# Patient Record
Sex: FEMALE | HISPANIC OR LATINO | Employment: FULL TIME | URBAN - METROPOLITAN AREA
[De-identification: names, ages, dates, MRNs, and addresses within clinical notes are randomized per-mention and may not be internally consistent; named-entity substitution may affect disease eponyms.]

---

## 2017-05-19 ENCOUNTER — HOSPITAL ENCOUNTER (OUTPATIENT)
Facility: MEDICAL CENTER | Age: 42
End: 2017-05-19
Attending: INTERNAL MEDICINE
Payer: MEDICAID

## 2017-05-19 LAB
CREAT UR-MCNC: 126.3 MG/DL
CREAT UR-MCNC: 25.4 MG/DL
POTASSIUM UR-SCNC: 17.7 MMOL/L
POTASSIUM UR-SCNC: 48.1 MMOL/L
SODIUM UR-SCNC: 111 MMOL/L
SODIUM UR-SCNC: 165 MMOL/L

## 2017-05-19 PROCEDURE — 82570 ASSAY OF URINE CREATININE: CPT

## 2017-05-19 PROCEDURE — 84300 ASSAY OF URINE SODIUM: CPT

## 2017-05-19 PROCEDURE — 82340 ASSAY OF CALCIUM IN URINE: CPT

## 2017-05-19 PROCEDURE — 84133 ASSAY OF URINE POTASSIUM: CPT | Mod: 91

## 2017-05-21 LAB
CALCIUM 24H UR-MCNC: 14.7 MG/DL
CALCIUM 24H UR-MCNC: 4.5 MG/DL
CALCIUM 24H UR-MRATE: NORMAL MG/D
CALCIUM 24H UR-MRATE: NORMAL MG/D
CALCIUM/CREAT 24H UR: 140 MG/G (ref 20–300)
CALCIUM/CREAT 24H UR: 225 MG/G (ref 20–300)
CREAT 24H UR-MCNC: 105 MG/DL
CREAT 24H UR-MCNC: 20 MG/DL
CREAT 24H UR-MRATE: NORMAL MG/D (ref 700–1600)
CREAT 24H UR-MRATE: NORMAL MG/D (ref 700–1600)
SPECIMEN VOL ?TM UR: NORMAL ML
SPECIMEN VOL ?TM UR: NORMAL ML

## 2020-03-13 ENCOUNTER — HOSPITAL ENCOUNTER (OUTPATIENT)
Dept: RADIOLOGY | Facility: MEDICAL CENTER | Age: 45
End: 2020-03-13
Attending: FAMILY MEDICINE
Payer: MEDICAID

## 2020-03-13 DIAGNOSIS — R10.2 PELVIC PAIN: ICD-10-CM

## 2020-08-17 ENCOUNTER — APPOINTMENT (OUTPATIENT)
Dept: ADMISSIONS | Facility: MEDICAL CENTER | Age: 45
DRG: 743 | End: 2020-08-17
Attending: OBSTETRICS & GYNECOLOGY
Payer: COMMERCIAL

## 2020-08-17 DIAGNOSIS — Z01.810 PRE-OPERATIVE CARDIOVASCULAR EXAMINATION: ICD-10-CM

## 2020-08-17 DIAGNOSIS — Z01.812 PRE-OPERATIVE LABORATORY EXAMINATION: ICD-10-CM

## 2020-08-17 LAB
ANION GAP SERPL CALC-SCNC: 13 MMOL/L (ref 7–16)
APPEARANCE UR: ABNORMAL
BACTERIA #/AREA URNS HPF: ABNORMAL /HPF
BASOPHILS # BLD AUTO: 0.7 % (ref 0–1.8)
BASOPHILS # BLD: 0.05 K/UL (ref 0–0.12)
BILIRUB UR QL STRIP.AUTO: NEGATIVE
BUN SERPL-MCNC: 19 MG/DL (ref 8–22)
CALCIUM SERPL-MCNC: 9.3 MG/DL (ref 8.5–10.5)
CHLORIDE SERPL-SCNC: 100 MMOL/L (ref 96–112)
CO2 SERPL-SCNC: 24 MMOL/L (ref 20–33)
COLOR UR: YELLOW
CREAT SERPL-MCNC: 0.49 MG/DL (ref 0.5–1.4)
EKG IMPRESSION: NORMAL
EOSINOPHIL # BLD AUTO: 0.14 K/UL (ref 0–0.51)
EOSINOPHIL NFR BLD: 1.9 % (ref 0–6.9)
EPI CELLS #/AREA URNS HPF: ABNORMAL /HPF
ERYTHROCYTE [DISTWIDTH] IN BLOOD BY AUTOMATED COUNT: 46 FL (ref 35.9–50)
GLUCOSE SERPL-MCNC: 101 MG/DL (ref 65–99)
GLUCOSE UR STRIP.AUTO-MCNC: NEGATIVE MG/DL
HCG SERPL QL: NEGATIVE
HCT VFR BLD AUTO: 35.6 % (ref 37–47)
HGB BLD-MCNC: 10.8 G/DL (ref 12–16)
HYALINE CASTS #/AREA URNS LPF: ABNORMAL /LPF
IMM GRANULOCYTES # BLD AUTO: 0.03 K/UL (ref 0–0.11)
IMM GRANULOCYTES NFR BLD AUTO: 0.4 % (ref 0–0.9)
KETONES UR STRIP.AUTO-MCNC: NEGATIVE MG/DL
LEUKOCYTE ESTERASE UR QL STRIP.AUTO: NEGATIVE
LYMPHOCYTES # BLD AUTO: 2.81 K/UL (ref 1–4.8)
LYMPHOCYTES NFR BLD: 37.2 % (ref 22–41)
MCH RBC QN AUTO: 23 PG (ref 27–33)
MCHC RBC AUTO-ENTMCNC: 30.3 G/DL (ref 33.6–35)
MCV RBC AUTO: 75.9 FL (ref 81.4–97.8)
MICRO URNS: ABNORMAL
MONOCYTES # BLD AUTO: 0.51 K/UL (ref 0–0.85)
MONOCYTES NFR BLD AUTO: 6.7 % (ref 0–13.4)
NEUTROPHILS # BLD AUTO: 4.02 K/UL (ref 2–7.15)
NEUTROPHILS NFR BLD: 53.1 % (ref 44–72)
NITRITE UR QL STRIP.AUTO: NEGATIVE
NRBC # BLD AUTO: 0 K/UL
NRBC BLD-RTO: 0 /100 WBC
PH UR STRIP.AUTO: 6 [PH] (ref 5–8)
PLATELET # BLD AUTO: 347 K/UL (ref 164–446)
PMV BLD AUTO: 10.5 FL (ref 9–12.9)
POTASSIUM SERPL-SCNC: 4.6 MMOL/L (ref 3.6–5.5)
PROT UR QL STRIP: NEGATIVE MG/DL
RBC # BLD AUTO: 4.69 M/UL (ref 4.2–5.4)
RBC # URNS HPF: ABNORMAL /HPF
RBC UR QL AUTO: NEGATIVE
SODIUM SERPL-SCNC: 137 MMOL/L (ref 135–145)
SP GR UR STRIP.AUTO: 1.02
UROBILINOGEN UR STRIP.AUTO-MCNC: 0.2 MG/DL
WBC # BLD AUTO: 7.6 K/UL (ref 4.8–10.8)
WBC #/AREA URNS HPF: ABNORMAL /HPF

## 2020-08-17 PROCEDURE — 36415 COLL VENOUS BLD VENIPUNCTURE: CPT

## 2020-08-17 PROCEDURE — 80048 BASIC METABOLIC PNL TOTAL CA: CPT

## 2020-08-17 PROCEDURE — 81001 URINALYSIS AUTO W/SCOPE: CPT

## 2020-08-17 PROCEDURE — 85025 COMPLETE CBC W/AUTO DIFF WBC: CPT

## 2020-08-17 PROCEDURE — 84703 CHORIONIC GONADOTROPIN ASSAY: CPT

## 2020-08-17 RX ORDER — LISINOPRIL 10 MG/1
10 TABLET ORAL DAILY
COMMUNITY
End: 2022-01-20

## 2020-08-17 NOTE — OR NURSING
Pt tested positive for Covid on 7/14/20. Test was done at the [facility] - [place], [place]. Pt denies any Covid symptoms currently and states she only had headaches. She reports her last headache was on 8/1/2020. Copy of Covid result scanned into patient's chart.

## 2020-08-20 NOTE — PROGRESS NOTES
COVID-19 Pre-surgery screening:    Do you have an undiagnosed respiratory illness or symptoms such as coughing or sneezing? No    1. Do you have an unexplained fever greater than 100.4 degrees Fahrenheit or 38 degrees Celsius?     No    2. Have you had direct exposure to a patient who tested positive for Covid-19?    No    Have you had any loss of your sense of taste or smell? Have you had N/V or sore throat?No    Patient has been informed of visitor policy and asked to wear a mask upon entering the hospital   Yes    PT Syriac SPEAKING ONLY, TRANSLATION PROVIDED BY ALTHEA MENA.

## 2020-08-21 ENCOUNTER — HOSPITAL ENCOUNTER (INPATIENT)
Facility: MEDICAL CENTER | Age: 45
LOS: 2 days | DRG: 743 | End: 2020-08-23
Attending: OBSTETRICS & GYNECOLOGY | Admitting: OBSTETRICS & GYNECOLOGY
Payer: COMMERCIAL

## 2020-08-21 ENCOUNTER — ANESTHESIA EVENT (OUTPATIENT)
Dept: SURGERY | Facility: MEDICAL CENTER | Age: 45
DRG: 743 | End: 2020-08-21
Payer: COMMERCIAL

## 2020-08-21 ENCOUNTER — ANESTHESIA (OUTPATIENT)
Dept: SURGERY | Facility: MEDICAL CENTER | Age: 45
DRG: 743 | End: 2020-08-21
Payer: COMMERCIAL

## 2020-08-21 LAB — HCG UR QL: NEGATIVE

## 2020-08-21 PROCEDURE — 770006 HCHG ROOM/CARE - MED/SURG/GYN SEMI*

## 2020-08-21 PROCEDURE — 700101 HCHG RX REV CODE 250: Performed by: ANESTHESIOLOGY

## 2020-08-21 PROCEDURE — 160036 HCHG PACU - EA ADDL 30 MINS PHASE I: Performed by: OBSTETRICS & GYNECOLOGY

## 2020-08-21 PROCEDURE — 160042 HCHG SURGERY MINUTES - EA ADDL 1 MIN LEVEL 5: Performed by: OBSTETRICS & GYNECOLOGY

## 2020-08-21 PROCEDURE — 500886 HCHG PACK, LAPAROSCOPY: Performed by: OBSTETRICS & GYNECOLOGY

## 2020-08-21 PROCEDURE — A9270 NON-COVERED ITEM OR SERVICE: HCPCS | Performed by: OBSTETRICS & GYNECOLOGY

## 2020-08-21 PROCEDURE — 501582 HCHG TROCAR, THRD BLADED: Performed by: OBSTETRICS & GYNECOLOGY

## 2020-08-21 PROCEDURE — A4338 INDWELLING CATHETER LATEX: HCPCS | Performed by: OBSTETRICS & GYNECOLOGY

## 2020-08-21 PROCEDURE — 700111 HCHG RX REV CODE 636 W/ 250 OVERRIDE (IP): Performed by: ANESTHESIOLOGY

## 2020-08-21 PROCEDURE — 700102 HCHG RX REV CODE 250 W/ 637 OVERRIDE(OP): Performed by: ANESTHESIOLOGY

## 2020-08-21 PROCEDURE — 500002 HCHG ADHESIVE, DERMABOND: Performed by: OBSTETRICS & GYNECOLOGY

## 2020-08-21 PROCEDURE — 500800 HCHG LAPAROSCOPIC J/L HOOK: Performed by: OBSTETRICS & GYNECOLOGY

## 2020-08-21 PROCEDURE — 501581 HCHG TROCAR: Performed by: OBSTETRICS & GYNECOLOGY

## 2020-08-21 PROCEDURE — 700111 HCHG RX REV CODE 636 W/ 250 OVERRIDE (IP): Performed by: OBSTETRICS & GYNECOLOGY

## 2020-08-21 PROCEDURE — 500522 HCHG ENDOSTITCH SUTURING DEVICE: Performed by: OBSTETRICS & GYNECOLOGY

## 2020-08-21 PROCEDURE — 501577 HCHG TROCAR, STEP 11MM: Performed by: OBSTETRICS & GYNECOLOGY

## 2020-08-21 PROCEDURE — 700101 HCHG RX REV CODE 250: Performed by: OBSTETRICS & GYNECOLOGY

## 2020-08-21 PROCEDURE — 501330 HCHG SET, CYSTO IRRIG TUBING: Performed by: OBSTETRICS & GYNECOLOGY

## 2020-08-21 PROCEDURE — 0UT70ZZ RESECTION OF BILATERAL FALLOPIAN TUBES, OPEN APPROACH: ICD-10-PCS | Performed by: OBSTETRICS & GYNECOLOGY

## 2020-08-21 PROCEDURE — 700102 HCHG RX REV CODE 250 W/ 637 OVERRIDE(OP): Performed by: OBSTETRICS & GYNECOLOGY

## 2020-08-21 PROCEDURE — 0DNU0ZZ RELEASE OMENTUM, OPEN APPROACH: ICD-10-PCS | Performed by: OBSTETRICS & GYNECOLOGY

## 2020-08-21 PROCEDURE — 160002 HCHG RECOVERY MINUTES (STAT): Performed by: OBSTETRICS & GYNECOLOGY

## 2020-08-21 PROCEDURE — 81025 URINE PREGNANCY TEST: CPT

## 2020-08-21 PROCEDURE — 0UJD4ZZ INSPECTION OF UTERUS AND CERVIX, PERCUTANEOUS ENDOSCOPIC APPROACH: ICD-10-PCS | Performed by: OBSTETRICS & GYNECOLOGY

## 2020-08-21 PROCEDURE — 160048 HCHG OR STATISTICAL LEVEL 1-5: Performed by: OBSTETRICS & GYNECOLOGY

## 2020-08-21 PROCEDURE — 700105 HCHG RX REV CODE 258: Performed by: OBSTETRICS & GYNECOLOGY

## 2020-08-21 PROCEDURE — 502703 HCHG DEVICE, LIGASURE V SEALER: Performed by: OBSTETRICS & GYNECOLOGY

## 2020-08-21 PROCEDURE — 0TNB0ZZ RELEASE BLADDER, OPEN APPROACH: ICD-10-PCS | Performed by: OBSTETRICS & GYNECOLOGY

## 2020-08-21 PROCEDURE — 160009 HCHG ANES TIME/MIN: Performed by: OBSTETRICS & GYNECOLOGY

## 2020-08-21 PROCEDURE — 501838 HCHG SUTURE GENERAL: Performed by: OBSTETRICS & GYNECOLOGY

## 2020-08-21 PROCEDURE — 160035 HCHG PACU - 1ST 60 MINS PHASE I: Performed by: OBSTETRICS & GYNECOLOGY

## 2020-08-21 PROCEDURE — 0UT90ZL RESECTION OF UTERUS, SUPRACERVICAL, OPEN APPROACH: ICD-10-PCS | Performed by: OBSTETRICS & GYNECOLOGY

## 2020-08-21 PROCEDURE — A9270 NON-COVERED ITEM OR SERVICE: HCPCS | Performed by: ANESTHESIOLOGY

## 2020-08-21 PROCEDURE — 503051 HCHG CLOSURE PRINEO SKIN: Performed by: OBSTETRICS & GYNECOLOGY

## 2020-08-21 PROCEDURE — 160031 HCHG SURGERY MINUTES - 1ST 30 MINS LEVEL 5: Performed by: OBSTETRICS & GYNECOLOGY

## 2020-08-21 PROCEDURE — 501411 HCHG SPONGE, BABY LAP W/O RINGS: Performed by: OBSTETRICS & GYNECOLOGY

## 2020-08-21 PROCEDURE — 88307 TISSUE EXAM BY PATHOLOGIST: CPT

## 2020-08-21 RX ORDER — DEXAMETHASONE SODIUM PHOSPHATE 4 MG/ML
4 INJECTION, SOLUTION INTRA-ARTICULAR; INTRALESIONAL; INTRAMUSCULAR; INTRAVENOUS; SOFT TISSUE
Status: DISCONTINUED | OUTPATIENT
Start: 2020-08-21 | End: 2020-08-23 | Stop reason: HOSPADM

## 2020-08-21 RX ORDER — OXYCODONE HYDROCHLORIDE AND ACETAMINOPHEN 5; 325 MG/1; MG/1
1-2 TABLET ORAL EVERY 4 HOURS PRN
Status: DISCONTINUED | OUTPATIENT
Start: 2020-08-21 | End: 2020-08-23 | Stop reason: HOSPADM

## 2020-08-21 RX ORDER — BUPIVACAINE HYDROCHLORIDE AND EPINEPHRINE 2.5; 5 MG/ML; UG/ML
INJECTION, SOLUTION EPIDURAL; INFILTRATION; INTRACAUDAL; PERINEURAL
Status: DISCONTINUED | OUTPATIENT
Start: 2020-08-21 | End: 2020-08-21 | Stop reason: HOSPADM

## 2020-08-21 RX ORDER — ONDANSETRON 2 MG/ML
4 INJECTION INTRAMUSCULAR; INTRAVENOUS
Status: DISCONTINUED | OUTPATIENT
Start: 2020-08-21 | End: 2020-08-21 | Stop reason: HOSPADM

## 2020-08-21 RX ORDER — AMOXICILLIN 250 MG
1 CAPSULE ORAL NIGHTLY
Status: DISCONTINUED | OUTPATIENT
Start: 2020-08-21 | End: 2020-08-23 | Stop reason: HOSPADM

## 2020-08-21 RX ORDER — HYDROMORPHONE HYDROCHLORIDE 1 MG/ML
0.1 INJECTION, SOLUTION INTRAMUSCULAR; INTRAVENOUS; SUBCUTANEOUS
Status: DISCONTINUED | OUTPATIENT
Start: 2020-08-21 | End: 2020-08-21

## 2020-08-21 RX ORDER — HYDROMORPHONE HYDROCHLORIDE 2 MG/ML
INJECTION, SOLUTION INTRAMUSCULAR; INTRAVENOUS; SUBCUTANEOUS PRN
Status: DISCONTINUED | OUTPATIENT
Start: 2020-08-21 | End: 2020-08-21 | Stop reason: SURG

## 2020-08-21 RX ORDER — MEPERIDINE HYDROCHLORIDE 25 MG/ML
6.25 INJECTION INTRAMUSCULAR; INTRAVENOUS; SUBCUTANEOUS
Status: DISCONTINUED | OUTPATIENT
Start: 2020-08-21 | End: 2020-08-21 | Stop reason: HOSPADM

## 2020-08-21 RX ORDER — HALOPERIDOL 5 MG/ML
1 INJECTION INTRAMUSCULAR EVERY 6 HOURS PRN
Status: DISCONTINUED | OUTPATIENT
Start: 2020-08-21 | End: 2020-08-23 | Stop reason: HOSPADM

## 2020-08-21 RX ORDER — ACETAMINOPHEN 500 MG
1000 TABLET ORAL ONCE
Status: COMPLETED | OUTPATIENT
Start: 2020-08-21 | End: 2020-08-21

## 2020-08-21 RX ORDER — CELECOXIB 200 MG/1
200 CAPSULE ORAL ONCE
Status: COMPLETED | OUTPATIENT
Start: 2020-08-21 | End: 2020-08-21

## 2020-08-21 RX ORDER — SODIUM CHLORIDE, SODIUM LACTATE, POTASSIUM CHLORIDE, CALCIUM CHLORIDE 600; 310; 30; 20 MG/100ML; MG/100ML; MG/100ML; MG/100ML
INJECTION, SOLUTION INTRAVENOUS EVERY 6 HOURS
Status: COMPLETED | OUTPATIENT
Start: 2020-08-21 | End: 2020-08-22

## 2020-08-21 RX ORDER — DIPHENHYDRAMINE HYDROCHLORIDE 50 MG/ML
12.5 INJECTION INTRAMUSCULAR; INTRAVENOUS
Status: DISCONTINUED | OUTPATIENT
Start: 2020-08-21 | End: 2020-08-21 | Stop reason: HOSPADM

## 2020-08-21 RX ORDER — EPINEPHRINE 1 MG/ML(1)
AMPUL (ML) INJECTION
Status: COMPLETED
Start: 2020-08-21 | End: 2020-08-21

## 2020-08-21 RX ORDER — LIDOCAINE HYDROCHLORIDE 20 MG/ML
JELLY TOPICAL PRN
Status: DISCONTINUED | OUTPATIENT
Start: 2020-08-21 | End: 2020-08-21 | Stop reason: SURG

## 2020-08-21 RX ORDER — OXYCODONE HCL 5 MG/5 ML
5 SOLUTION, ORAL ORAL
Status: COMPLETED | OUTPATIENT
Start: 2020-08-21 | End: 2020-08-21

## 2020-08-21 RX ORDER — HALOPERIDOL 5 MG/ML
1 INJECTION INTRAMUSCULAR
Status: DISCONTINUED | OUTPATIENT
Start: 2020-08-21 | End: 2020-08-21 | Stop reason: HOSPADM

## 2020-08-21 RX ORDER — SODIUM CHLORIDE, SODIUM LACTATE, POTASSIUM CHLORIDE, CALCIUM CHLORIDE 600; 310; 30; 20 MG/100ML; MG/100ML; MG/100ML; MG/100ML
INJECTION, SOLUTION INTRAVENOUS CONTINUOUS
Status: ACTIVE | OUTPATIENT
Start: 2020-08-21 | End: 2020-08-22

## 2020-08-21 RX ORDER — CEFAZOLIN SODIUM 1 G/3ML
INJECTION, POWDER, FOR SOLUTION INTRAMUSCULAR; INTRAVENOUS PRN
Status: DISCONTINUED | OUTPATIENT
Start: 2020-08-21 | End: 2020-08-21 | Stop reason: SURG

## 2020-08-21 RX ORDER — DOCUSATE SODIUM 100 MG/1
100 CAPSULE, LIQUID FILLED ORAL 2 TIMES DAILY
Status: DISCONTINUED | OUTPATIENT
Start: 2020-08-21 | End: 2020-08-23 | Stop reason: HOSPADM

## 2020-08-21 RX ORDER — OXYCODONE HCL 5 MG/5 ML
10 SOLUTION, ORAL ORAL
Status: COMPLETED | OUTPATIENT
Start: 2020-08-21 | End: 2020-08-21

## 2020-08-21 RX ORDER — SCOLOPAMINE TRANSDERMAL SYSTEM 1 MG/1
1 PATCH, EXTENDED RELEASE TRANSDERMAL
Status: DISCONTINUED | OUTPATIENT
Start: 2020-08-21 | End: 2020-08-23 | Stop reason: HOSPADM

## 2020-08-21 RX ORDER — LIDOCAINE HYDROCHLORIDE 20 MG/ML
INJECTION, SOLUTION EPIDURAL; INFILTRATION; INTRACAUDAL; PERINEURAL PRN
Status: DISCONTINUED | OUTPATIENT
Start: 2020-08-21 | End: 2020-08-21 | Stop reason: SURG

## 2020-08-21 RX ORDER — ROCURONIUM BROMIDE 10 MG/ML
INJECTION, SOLUTION INTRAVENOUS PRN
Status: DISCONTINUED | OUTPATIENT
Start: 2020-08-21 | End: 2020-08-21 | Stop reason: SURG

## 2020-08-21 RX ORDER — SIMETHICONE 80 MG
80 TABLET,CHEWABLE ORAL EVERY 8 HOURS PRN
Status: DISCONTINUED | OUTPATIENT
Start: 2020-08-21 | End: 2020-08-23 | Stop reason: HOSPADM

## 2020-08-21 RX ORDER — ONDANSETRON 2 MG/ML
INJECTION INTRAMUSCULAR; INTRAVENOUS PRN
Status: DISCONTINUED | OUTPATIENT
Start: 2020-08-21 | End: 2020-08-21 | Stop reason: SURG

## 2020-08-21 RX ORDER — KETOROLAC TROMETHAMINE 30 MG/ML
30 INJECTION, SOLUTION INTRAMUSCULAR; INTRAVENOUS EVERY 6 HOURS
Status: COMPLETED | OUTPATIENT
Start: 2020-08-21 | End: 2020-08-22

## 2020-08-21 RX ORDER — BISACODYL 10 MG
10 SUPPOSITORY, RECTAL RECTAL
Status: DISCONTINUED | OUTPATIENT
Start: 2020-08-21 | End: 2020-08-23 | Stop reason: HOSPADM

## 2020-08-21 RX ORDER — DIPHENHYDRAMINE HYDROCHLORIDE 50 MG/ML
25 INJECTION INTRAMUSCULAR; INTRAVENOUS EVERY 6 HOURS PRN
Status: DISCONTINUED | OUTPATIENT
Start: 2020-08-21 | End: 2020-08-23 | Stop reason: HOSPADM

## 2020-08-21 RX ORDER — MIDAZOLAM HYDROCHLORIDE 1 MG/ML
INJECTION INTRAMUSCULAR; INTRAVENOUS PRN
Status: DISCONTINUED | OUTPATIENT
Start: 2020-08-21 | End: 2020-08-21 | Stop reason: SURG

## 2020-08-21 RX ORDER — LABETALOL HYDROCHLORIDE 5 MG/ML
5 INJECTION, SOLUTION INTRAVENOUS
Status: DISCONTINUED | OUTPATIENT
Start: 2020-08-21 | End: 2020-08-21 | Stop reason: HOSPADM

## 2020-08-21 RX ORDER — ONDANSETRON 2 MG/ML
4 INJECTION INTRAMUSCULAR; INTRAVENOUS EVERY 4 HOURS PRN
Status: DISCONTINUED | OUTPATIENT
Start: 2020-08-21 | End: 2020-08-23 | Stop reason: HOSPADM

## 2020-08-21 RX ORDER — HYDROMORPHONE HYDROCHLORIDE 1 MG/ML
0.4 INJECTION, SOLUTION INTRAMUSCULAR; INTRAVENOUS; SUBCUTANEOUS
Status: DISCONTINUED | OUTPATIENT
Start: 2020-08-21 | End: 2020-08-21

## 2020-08-21 RX ORDER — DEXAMETHASONE SODIUM PHOSPHATE 4 MG/ML
INJECTION, SOLUTION INTRA-ARTICULAR; INTRALESIONAL; INTRAMUSCULAR; INTRAVENOUS; SOFT TISSUE PRN
Status: DISCONTINUED | OUTPATIENT
Start: 2020-08-21 | End: 2020-08-21 | Stop reason: SURG

## 2020-08-21 RX ORDER — SODIUM CHLORIDE, SODIUM LACTATE, POTASSIUM CHLORIDE, CALCIUM CHLORIDE 600; 310; 30; 20 MG/100ML; MG/100ML; MG/100ML; MG/100ML
INJECTION, SOLUTION INTRAVENOUS CONTINUOUS
Status: DISCONTINUED | OUTPATIENT
Start: 2020-08-21 | End: 2020-08-21 | Stop reason: HOSPADM

## 2020-08-21 RX ORDER — BUPIVACAINE HYDROCHLORIDE 2.5 MG/ML
INJECTION, SOLUTION EPIDURAL; INFILTRATION; INTRACAUDAL
Status: COMPLETED
Start: 2020-08-21 | End: 2020-08-21

## 2020-08-21 RX ORDER — HYDROMORPHONE HYDROCHLORIDE 1 MG/ML
0.2 INJECTION, SOLUTION INTRAMUSCULAR; INTRAVENOUS; SUBCUTANEOUS
Status: DISCONTINUED | OUTPATIENT
Start: 2020-08-21 | End: 2020-08-21

## 2020-08-21 RX ORDER — CELECOXIB 200 MG/1
200 CAPSULE ORAL 2 TIMES DAILY
Status: DISCONTINUED | OUTPATIENT
Start: 2020-08-22 | End: 2020-08-23 | Stop reason: HOSPADM

## 2020-08-21 RX ADMIN — POVIDONE-IODINE 15 ML: 10 SOLUTION TOPICAL at 13:01

## 2020-08-21 RX ADMIN — DEXAMETHASONE SODIUM PHOSPHATE 8 MG: 4 INJECTION, SOLUTION INTRA-ARTICULAR; INTRALESIONAL; INTRAMUSCULAR; INTRAVENOUS; SOFT TISSUE at 15:20

## 2020-08-21 RX ADMIN — FENTANYL CITRATE 50 MCG: 50 INJECTION INTRAMUSCULAR; INTRAVENOUS at 19:27

## 2020-08-21 RX ADMIN — CELECOXIB 200 MG: 200 CAPSULE ORAL at 13:01

## 2020-08-21 RX ADMIN — FENTANYL CITRATE 100 MCG: 50 INJECTION INTRAMUSCULAR; INTRAVENOUS at 16:07

## 2020-08-21 RX ADMIN — PROPOFOL 60 MG: 10 INJECTION, EMULSION INTRAVENOUS at 17:19

## 2020-08-21 RX ADMIN — LIDOCAINE HYDROCHLORIDE 5 ML: 20 INJECTION, SOLUTION EPIDURAL; INFILTRATION; INTRACAUDAL at 14:57

## 2020-08-21 RX ADMIN — PROPOFOL 200 MG: 10 INJECTION, EMULSION INTRAVENOUS at 14:57

## 2020-08-21 RX ADMIN — SODIUM CHLORIDE, POTASSIUM CHLORIDE, SODIUM LACTATE AND CALCIUM CHLORIDE: 600; 310; 30; 20 INJECTION, SOLUTION INTRAVENOUS at 15:40

## 2020-08-21 RX ADMIN — SODIUM CHLORIDE, POTASSIUM CHLORIDE, SODIUM LACTATE AND CALCIUM CHLORIDE: 600; 310; 30; 20 INJECTION, SOLUTION INTRAVENOUS at 13:02

## 2020-08-21 RX ADMIN — OXYCODONE HYDROCHLORIDE 10 MG: 5 SOLUTION ORAL at 19:24

## 2020-08-21 RX ADMIN — FENTANYL CITRATE 100 MCG: 50 INJECTION INTRAMUSCULAR; INTRAVENOUS at 14:58

## 2020-08-21 RX ADMIN — CEFAZOLIN 2 G: 330 INJECTION, POWDER, FOR SOLUTION INTRAMUSCULAR; INTRAVENOUS at 15:00

## 2020-08-21 RX ADMIN — HYDROMORPHONE HYDROCHLORIDE 0.5 MG: 2 INJECTION, SOLUTION INTRAMUSCULAR; INTRAVENOUS; SUBCUTANEOUS at 17:11

## 2020-08-21 RX ADMIN — OXYCODONE HYDROCHLORIDE AND ACETAMINOPHEN 2 TABLET: 5; 325 TABLET ORAL at 23:30

## 2020-08-21 RX ADMIN — ROCURONIUM BROMIDE 20 MG: 10 INJECTION, SOLUTION INTRAVENOUS at 16:48

## 2020-08-21 RX ADMIN — EPHEDRINE SULFATE 10 MG: 50 INJECTION, SOLUTION INTRAVENOUS at 15:27

## 2020-08-21 RX ADMIN — SODIUM CHLORIDE, POTASSIUM CHLORIDE, SODIUM LACTATE AND CALCIUM CHLORIDE: 600; 310; 30; 20 INJECTION, SOLUTION INTRAVENOUS at 20:40

## 2020-08-21 RX ADMIN — ROCURONIUM BROMIDE 20 MG: 10 INJECTION, SOLUTION INTRAVENOUS at 15:42

## 2020-08-21 RX ADMIN — MEPERIDINE HYDROCHLORIDE 6.25 MG: 25 INJECTION INTRAMUSCULAR; INTRAVENOUS; SUBCUTANEOUS at 17:55

## 2020-08-21 RX ADMIN — LIDOCAINE HYDROCHLORIDE 3 ML: 20 JELLY TOPICAL at 14:58

## 2020-08-21 RX ADMIN — ONDANSETRON 4 MG: 2 INJECTION INTRAMUSCULAR; INTRAVENOUS at 17:02

## 2020-08-21 RX ADMIN — FENTANYL CITRATE 50 MCG: 50 INJECTION INTRAMUSCULAR; INTRAVENOUS at 15:08

## 2020-08-21 RX ADMIN — MEPERIDINE HYDROCHLORIDE 6.25 MG: 25 INJECTION INTRAMUSCULAR; INTRAVENOUS; SUBCUTANEOUS at 17:50

## 2020-08-21 RX ADMIN — ROCURONIUM BROMIDE 50 MG: 10 INJECTION, SOLUTION INTRAVENOUS at 14:57

## 2020-08-21 RX ADMIN — ACETAMINOPHEN 1000 MG: 500 TABLET ORAL at 13:01

## 2020-08-21 RX ADMIN — MIDAZOLAM HYDROCHLORIDE 2 MG: 1 INJECTION, SOLUTION INTRAMUSCULAR; INTRAVENOUS at 14:53

## 2020-08-21 RX ADMIN — ROCURONIUM BROMIDE 30 MG: 10 INJECTION, SOLUTION INTRAVENOUS at 16:07

## 2020-08-21 RX ADMIN — SUGAMMADEX 200 MG: 100 INJECTION, SOLUTION INTRAVENOUS at 17:09

## 2020-08-21 RX ADMIN — HYDROMORPHONE HYDROCHLORIDE 0.5 MG: 2 INJECTION, SOLUTION INTRAMUSCULAR; INTRAVENOUS; SUBCUTANEOUS at 17:14

## 2020-08-21 RX ADMIN — KETOROLAC TROMETHAMINE 30 MG: 30 INJECTION, SOLUTION INTRAMUSCULAR at 20:37

## 2020-08-21 SDOH — ECONOMIC STABILITY: FOOD INSECURITY: WITHIN THE PAST 12 MONTHS, YOU WORRIED THAT YOUR FOOD WOULD RUN OUT BEFORE YOU GOT MONEY TO BUY MORE.: NEVER TRUE

## 2020-08-21 SDOH — ECONOMIC STABILITY: FOOD INSECURITY: WITHIN THE PAST 12 MONTHS, THE FOOD YOU BOUGHT JUST DIDN'T LAST AND YOU DIDN'T HAVE MONEY TO GET MORE.: NEVER TRUE

## 2020-08-21 SDOH — ECONOMIC STABILITY: TRANSPORTATION INSECURITY
IN THE PAST 12 MONTHS, HAS LACK OF TRANSPORTATION KEPT YOU FROM MEETINGS, WORK, OR FROM GETTING THINGS NEEDED FOR DAILY LIVING?: NO

## 2020-08-21 SDOH — ECONOMIC STABILITY: TRANSPORTATION INSECURITY
IN THE PAST 12 MONTHS, HAS THE LACK OF TRANSPORTATION KEPT YOU FROM MEDICAL APPOINTMENTS OR FROM GETTING MEDICATIONS?: NO

## 2020-08-21 ASSESSMENT — COPD QUESTIONNAIRES
IN THE PAST 12 MONTHS DO YOU DO LESS THAN YOU USED TO BECAUSE OF YOUR BREATHING PROBLEMS: DISAGREE/UNSURE
DURING THE PAST 4 WEEKS HOW MUCH DID YOU FEEL SHORT OF BREATH: NONE/LITTLE OF THE TIME
HAVE YOU SMOKED AT LEAST 100 CIGARETTES IN YOUR ENTIRE LIFE: NO/DON'T KNOW
COPD SCREENING SCORE: 0
DO YOU EVER COUGH UP ANY MUCUS OR PHLEGM?: NO/ONLY WITH OCCASIONAL COLDS OR INFECTIONS

## 2020-08-21 ASSESSMENT — COGNITIVE AND FUNCTIONAL STATUS - GENERAL
SUGGESTED CMS G CODE MODIFIER MOBILITY: CJ
WALKING IN HOSPITAL ROOM: A LITTLE
MOBILITY SCORE: 20
MOVING TO AND FROM BED TO CHAIR: A LITTLE
DRESSING REGULAR LOWER BODY CLOTHING: A LITTLE
DAILY ACTIVITIY SCORE: 23
CLIMB 3 TO 5 STEPS WITH RAILING: A LITTLE
MOVING FROM LYING ON BACK TO SITTING ON SIDE OF FLAT BED: A LITTLE
SUGGESTED CMS G CODE MODIFIER DAILY ACTIVITY: CI

## 2020-08-21 ASSESSMENT — PATIENT HEALTH QUESTIONNAIRE - PHQ9
2. FEELING DOWN, DEPRESSED, IRRITABLE, OR HOPELESS: NOT AT ALL
1. LITTLE INTEREST OR PLEASURE IN DOING THINGS: NOT AT ALL
SUM OF ALL RESPONSES TO PHQ9 QUESTIONS 1 AND 2: 0

## 2020-08-21 ASSESSMENT — LIFESTYLE VARIABLES
TOTAL SCORE: 0
EVER HAD A DRINK FIRST THING IN THE MORNING TO STEADY YOUR NERVES TO GET RID OF A HANGOVER: NO
TOTAL SCORE: 0
TOTAL SCORE: 0
HAVE YOU EVER FELT YOU SHOULD CUT DOWN ON YOUR DRINKING: NO
ALCOHOL_USE: NO
HAVE PEOPLE ANNOYED YOU BY CRITICIZING YOUR DRINKING: NO
AVERAGE NUMBER OF DAYS PER WEEK YOU HAVE A DRINK CONTAINING ALCOHOL: 0
ON A TYPICAL DAY WHEN YOU DRINK ALCOHOL HOW MANY DRINKS DO YOU HAVE: 0
EVER_SMOKED: NEVER
EVER FELT BAD OR GUILTY ABOUT YOUR DRINKING: NO
HOW MANY TIMES IN THE PAST YEAR HAVE YOU HAD 5 OR MORE DRINKS IN A DAY: 0
CONSUMPTION TOTAL: NEGATIVE

## 2020-08-21 NOTE — ANESTHESIA PROCEDURE NOTES
Airway    Date/Time: 8/21/2020 2:59 PM  Performed by: Bryant Santos M.D.  Authorized by: Bryant Santos M.D.     Location:  OR  Urgency:  Elective  Difficult Airway: No    Indications for Airway Management:  Anesthesia      Spontaneous Ventilation: absent    Sedation Level:  Deep  Preoxygenated: Yes    Patient Position:  Sniffing  Mask Difficulty Assessment:  1 - vent by mask  Final Airway Type:  Endotracheal airway  Final Endotracheal Airway:  ETT  Cuffed: Yes    Technique Used for Successful ETT Placement:  Direct laryngoscopy    Insertion Site:  Oral  Blade Type:  Ozzie  Laryngoscope Blade/Videolaryngoscope Blade Size:  3  ETT Size (mm):  7.0  Measured from:  Teeth  ETT to Teeth (cm):  21  Placement Verified by: auscultation and capnometry    Cormack-Lehane Classification:  Grade I - full view of glottis  Number of Attempts at Approach:  1   Atraumatic DLx1 by Juanito Roe MS4

## 2020-08-21 NOTE — ANESTHESIA PREPROCEDURE EVALUATION
Abnormal uterine bleeding, fibroids    Relevant Problems   CARDIAC   (+) Chronic hypertension   had positive COVID test 7/10. She reported her symptoms were HA and some chest tightness/sob.   Patient reports that her symptoms were mild and resolved by 8/1/20. No current symptoms of infection, denies fevers, SOB, cough.     Physical Exam    Airway   Mallampati: II  TM distance: >3 FB  Neck ROM: full       Cardiovascular - normal exam  Rhythm: regular  Rate: normal  (-) murmur     Dental - normal exam           Pulmonary - normal exam  Breath sounds clear to auscultation     Abdominal    Neurological - normal exam                 Anesthesia Plan    ASA 2       Plan - general       Airway plan will be ETT        Induction: intravenous    Postoperative Plan: Postoperative administration of opioids is intended.    Pertinent diagnostic labs and testing reviewed    Informed Consent:    Anesthetic plan and risks discussed with patient.    Use of blood products discussed with: patient whom consented to blood products.

## 2020-08-21 NOTE — H&P
HISTORY AND PHYSICAL     PRE-OP DIAGNOSIS:   1. ABNORMAL UTERINE BLEEDING   2. CHRONIC PELVIC PAIN   3. FIBROID UTERUS     PROPOSED SURGICAL PROCEDURE:   1. TOTAL LAPAROSCOPIC HYSTERECTOMY WITH BILATERAL SALPINGECTOMY    2. POSSIBLE TOTAL ABDOMINAL HYSTERECTOMY WITH BILATERAL SALPINGECTOMY   3. CYSTOSCOPY     HISTORY OF PRESENT ILLNESS:    Referred by: [facility] - Dr Ribeiro ; Pelvic US and MRI - showed 9.8 cm subserous fibroid on the left, bilateral ovaries are normal ; no adnexal masses    Patient presents for mass in the abdomen, ultrasound demonstrating uterine fibroids, abnormal uterine bleeding and heavy periods x 3 years; cramping with periods x 3 months now has cramps even w/o periods x the last 2 months. Condition symptoms are described as intermittently present and is reported as having been present for 3 months.    Endometrial biopsy done had benign results    Pertinent positive/negative symptoms include (-)abdominal distension, (-)abdominal pain, (-)back pain, (+)dyspareunia, (-)flank pain, (-)hematuria, (+)pelvic pain, (-)suprapubic pain, (-)perineal pain and (-)urinary incontinence.    Smoking status: smoking duration is 5 years and quit smoking.    Review of systems:  Pertinent positives documented in HPI and all other systems reviewed & are negative    All PMH, PSH, allergies, social history and FH reviewed and updated today:  Past Medical History:   Diagnosis Date   • Advanced maternal age in multigravida age 40  12/3/2015   • Cigarette smoker quit 4 months ago, has been smoking since age 9 12/3/2015   • History of drug use no meth used for last year 12/3/2015   • Hypertension    • Kidney disease     hx kidney stones        Past Surgical History:   Procedure Laterality Date   • REPEAT C SECTION W TUBAL LIGATION N/A 6/6/2016    Procedure: REPEAT C SECTION WITH  bilateral TUBAL LIGATION;  Surgeon: Savanna Baig M.D.;  Location: LABOR AND DELIVERY;  Service:    • PRIMARY C SECTION  03/20/1998      "[facility]       Allergies: No Known Allergies    Social History     Socioeconomic History   • Marital status: Single     Spouse name: Not on file   • Number of children: Not on file   • Years of education: Not on file   • Highest education level: Not on file   Occupational History   • Not on file   Social Needs   • Financial resource strain: Not on file   • Food insecurity     Worry: Not on file     Inability: Not on file   • Transportation needs     Medical: Not on file     Non-medical: Not on file   Tobacco Use   • Smoking status: Former Smoker     Packs/day: 0.50     Years: 15.00     Pack years: 7.50     Quit date:      Years since quittin.6   • Smokeless tobacco: Never Used   • Tobacco comment: Quit smoking 5 months ago. 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   • Sexual activity: Yes     Partners: Male   Lifestyle   • Physical activity     Days per week: Not on file     Minutes per session: Not on file   • Stress: Not on file   Relationships   • Social connections     Talks on phone: Not on file     Gets together: Not on file     Attends Baptism service: Not on file     Active member of club or organization: Not on file     Attends meetings of clubs or organizations: Not on file     Relationship status: Not on file   • Intimate partner violence     Fear of current or ex partner: Not on file     Emotionally abused: Not on file     Physically abused: Not on file     Forced sexual activity: Not on file   Other Topics Concern   • Not on file   Social History Narrative   • Not on file       No family history on file.    Physical exam:  Ht 1.626 m (5' 4\")   Wt 71.6 kg (157 lb 13.6 oz)     General:appears stated age, is in no apparent distress, is well developed and well nourished  Head: normocephalic, non-tender  Neck: neck is supple  CV : regular rate " and rhythm, no peripheral edema  Lungs: Normal respiratory effort. Clear to auscultation bilaterally  Abdomen: Bowel sounds positive, nondistended, soft, nontender x4, no rebound or guarding. No organomegaly. No masses.  Female GYN: deferred   Skin: No rashes, or ulcers or lesions seen  Psychiatric: Patient shows appropriate affect, is alert and oriented x3, intact judgment and insight.    ASSESSMENT AND PLAN:   1. ABNORMAL UTERINE BLEEDING   2. CHRONIC PELVIC PAIN   3. FIBROID UTERUS     PROPOSED SURGICAL PROCEDURE:   1. TOTAL LAPAROSCOPIC HYSTERECTOMY WITH BILATERAL SALPINGECTOMY    2. POSSIBLE TOTAL ABDOMINAL HYSTERECTOMY WITH BILATERAL SALPINGECTOMY   3. CYSTOSCOPY     Specific to Laparoscopic surgery , patient made aware of increased risk of trocoar injuries to the intestine, major /minor blood vessels and bladder.Patient is also made aware of the slight increase risk to those organs mention from thermal ( burn) injuries. Patient is aware that the small operating arena of this type surgery may make immediate diagnosis of these injuries difficult. Thus the patient acknowledges both immediate and delayed diagnosis of these complications from this type of surgery are possible, albeit rare. _______________   Specific to Abdominal surgery ( open), patient is made aware of risk of injury to the bowel, bladder, nerves and the ureter ( urine conduit). Complications to these organs are rare, but do occur and indeed it was discussed that the specific pathology of the patient that necessitated surgery may make the risk greater. Patient additionally is aware of the risk of subsequent adhesions or small bowel obstruction from open abdominal surgery.      Specific to BSO: patient made aware that since most ovarian cyst formation post hysterectomy is related to adhesions and inflammatory reaction, that subsequent adhesions and recurrence of pain and dyspareunia may occur.   Patient also aware of increased risk of injury to  bowel and bladder is present as result of adhesions and every effort including preoperative bowel prep will be used to minimize this risk                                                                                       Specific to Hysterectomy, patient is aware that although alternative methods exist for uterine preservation, that both patient and surgeon, agree that the benefits to hysterectomy outweigh the risks and that alternative methods are not indicated or beneficial for her specific pathology ( problem) Patient is also aware that removal of the ovaries may if not already in menopause, speed the onset of menopause, and that specific discussions regarding the risk/benefit of ovarian preservation vs removal and the use of hormone replacement have been discussed and all questions answered.    Cytoscopy will be performed after the hysterectomy, although of rare occurrence, there is always an inherent risk of damaging the urinary tract in most major gynecologic surgeries. Up to 75% of ureteric injuries are caused by gynecologic surgery and interestingly, most injuries occur during procedures for benign diseases. The timely detection of a urinary tract injury by cystoscopy intraoperatively allows for immediate referral and repair by a urologist or a urogynecologist during the same surgical procedure. It should be borne in mind, however, that cystoscopy is not 100% sensitive or specific. Injuries of thermal nature, secondary to devascularization or suture necrosis, can still be missed intraoperatively by cystoscopy, even with visualization of ureteric jets or an intact bladder.    GERTRUDIS CABA MD

## 2020-08-22 LAB
BASOPHILS # BLD AUTO: 0.3 % (ref 0–1.8)
BASOPHILS # BLD: 0.05 K/UL (ref 0–0.12)
EOSINOPHIL # BLD AUTO: 0.01 K/UL (ref 0–0.51)
EOSINOPHIL NFR BLD: 0.1 % (ref 0–6.9)
ERYTHROCYTE [DISTWIDTH] IN BLOOD BY AUTOMATED COUNT: 45.2 FL (ref 35.9–50)
HCT VFR BLD AUTO: 30.8 % (ref 37–47)
HGB BLD-MCNC: 9.3 G/DL (ref 12–16)
IMM GRANULOCYTES # BLD AUTO: 0.05 K/UL (ref 0–0.11)
IMM GRANULOCYTES NFR BLD AUTO: 0.3 % (ref 0–0.9)
LYMPHOCYTES # BLD AUTO: 1.76 K/UL (ref 1–4.8)
LYMPHOCYTES NFR BLD: 12.1 % (ref 22–41)
MCH RBC QN AUTO: 22.7 PG (ref 27–33)
MCHC RBC AUTO-ENTMCNC: 30.2 G/DL (ref 33.6–35)
MCV RBC AUTO: 75.3 FL (ref 81.4–97.8)
MONOCYTES # BLD AUTO: 1.03 K/UL (ref 0–0.85)
MONOCYTES NFR BLD AUTO: 7.1 % (ref 0–13.4)
NEUTROPHILS # BLD AUTO: 11.64 K/UL (ref 2–7.15)
NEUTROPHILS NFR BLD: 80.1 % (ref 44–72)
NRBC # BLD AUTO: 0 K/UL
NRBC BLD-RTO: 0 /100 WBC
PATHOLOGY CONSULT NOTE: NORMAL
PLATELET # BLD AUTO: 341 K/UL (ref 164–446)
PMV BLD AUTO: 10.6 FL (ref 9–12.9)
RBC # BLD AUTO: 4.09 M/UL (ref 4.2–5.4)
WBC # BLD AUTO: 14.5 K/UL (ref 4.8–10.8)

## 2020-08-22 PROCEDURE — 700102 HCHG RX REV CODE 250 W/ 637 OVERRIDE(OP): Performed by: OBSTETRICS & GYNECOLOGY

## 2020-08-22 PROCEDURE — A9270 NON-COVERED ITEM OR SERVICE: HCPCS | Performed by: OBSTETRICS & GYNECOLOGY

## 2020-08-22 PROCEDURE — 770006 HCHG ROOM/CARE - MED/SURG/GYN SEMI*

## 2020-08-22 PROCEDURE — 36415 COLL VENOUS BLD VENIPUNCTURE: CPT

## 2020-08-22 PROCEDURE — 85025 COMPLETE CBC W/AUTO DIFF WBC: CPT

## 2020-08-22 PROCEDURE — 700111 HCHG RX REV CODE 636 W/ 250 OVERRIDE (IP): Performed by: OBSTETRICS & GYNECOLOGY

## 2020-08-22 RX ORDER — ZOLPIDEM TARTRATE 5 MG/1
5 TABLET ORAL NIGHTLY PRN
Status: COMPLETED | OUTPATIENT
Start: 2020-08-22 | End: 2020-08-22

## 2020-08-22 RX ADMIN — OXYCODONE HYDROCHLORIDE AND ACETAMINOPHEN 1 TABLET: 5; 325 TABLET ORAL at 22:11

## 2020-08-22 RX ADMIN — ZOLPIDEM TARTRATE 5 MG: 5 TABLET ORAL at 22:11

## 2020-08-22 RX ADMIN — DOCUSATE SODIUM 100 MG: 100 CAPSULE ORAL at 17:42

## 2020-08-22 RX ADMIN — OXYCODONE HYDROCHLORIDE AND ACETAMINOPHEN 1 TABLET: 5; 325 TABLET ORAL at 17:42

## 2020-08-22 RX ADMIN — OXYCODONE HYDROCHLORIDE AND ACETAMINOPHEN 1 TABLET: 5; 325 TABLET ORAL at 11:01

## 2020-08-22 RX ADMIN — OXYCODONE HYDROCHLORIDE AND ACETAMINOPHEN 2 TABLET: 5; 325 TABLET ORAL at 04:55

## 2020-08-22 RX ADMIN — ONDANSETRON 4 MG: 2 INJECTION INTRAMUSCULAR; INTRAVENOUS at 00:36

## 2020-08-22 RX ADMIN — KETOROLAC TROMETHAMINE 30 MG: 30 INJECTION, SOLUTION INTRAMUSCULAR at 15:58

## 2020-08-22 RX ADMIN — KETOROLAC TROMETHAMINE 30 MG: 30 INJECTION, SOLUTION INTRAMUSCULAR at 02:44

## 2020-08-22 RX ADMIN — CELECOXIB 200 MG: 200 CAPSULE ORAL at 22:54

## 2020-08-22 RX ADMIN — KETOROLAC TROMETHAMINE 30 MG: 30 INJECTION, SOLUTION INTRAMUSCULAR at 08:20

## 2020-08-22 NOTE — OR NURSING
1730 Pt arrived from OR with Dr. Santos.  Pt slightly hypotensive, oral airway in place.  Abdomen soft, haywood to d/d, mono-pad in place.    1733 Pt waking up, oral airway removed, pt moved to room air.    1745 Pt shivering, warmer placed and warm blankets provided.  1750 Shivering still not improving, pt medicated with IV demerol.    1755 Pt medicated again with IV demerol.    1820 Called and updated pt daughter on POC. Pt sleeping at this time.   1927 Pt tolerating sips of water well, denies nausea, but admits to pain.  Pt medicated with PO oxycodone and IV Fentanyl per order.   1945 Report called to Raeann KNIGHT. Emptied haywood bag.    1959 Pt left with transport to go to room.  Pt daughter brought up with patient to room.

## 2020-08-22 NOTE — PROGRESS NOTES
Assessment complete.  A&O x 4. Patient calls appropriately.  Patient ambulates with x1 handheld assist. Bed alarm off.   Patient has 8/10 pain. Pain managed with prescribed medications.  Denies N&V. Tolerating reg diet.  Surgical incision CDI.  + void into haywood, - flatus, - BM.  Patient denies SOB.  SCD's on.    Review plan with of care with patient. Call light and personal belongings with in reach. Hourly rounding in place. All needs met at this time.

## 2020-08-22 NOTE — CARE PLAN
Problem: Communication  Goal: The ability to communicate needs accurately and effectively will improve  Outcome: PROGRESSING AS EXPECTED  Note:  in use when needed, pt introductions made, patient able to make needs known, all needs met at this time.      Problem: Venous Thromboembolism (VTW)/Deep Vein Thrombosis (DVT) Prevention:  Goal: Patient will participate in Venous Thrombosis (VTE)/Deep Vein Thrombosis (DVT)Prevention Measures  Outcome: PROGRESSING AS EXPECTED  Flowsheets (Taken 8/22/2020 6551)  SCDs, Sequential Compression Device: On  Note: SCDs on while in bed, up to chair, pt encouraged to flex knees and ankles while in bed.

## 2020-08-22 NOTE — CARE PLAN
Problem: Communication  Goal: The ability to communicate needs accurately and effectively will improve  Outcome: PROGRESSING AS EXPECTED  Note: Updated on POC, pt able to communicate needs appropriately, and call light is within reach. Using interpretor services as needed.     Problem: Safety  Goal: Will remain free from injury  Outcome: PROGRESSING AS EXPECTED  Goal: Will remain free from falls  Outcome: PROGRESSING AS EXPECTED  Note: Educated on call light usage, steady-shuffling gait

## 2020-08-22 NOTE — PROGRESS NOTES
Received report from NOC RN.   Pt resting in bed, states pain 7/0. Scheduled toradol given, will medicate PRN when due.   Pt O2 sat 100% on RA, denies SOB/N/V.   Was been hypotensive overnight, PO fluids offered and encouraged.   Large transverse incision dressing intact, old drainage visible. 3 smaller lap sites CDI, edges approximated with dermabond, open to air.   Dominguez was d/c at 5 am, pt has voided.   Last BM PTA. BS hypo all 4 quads. Ambulation and up to chair encouraged.   Tolerating regular diet.    Spoke with daughter on phone, she will bring food from home that patient prefers.    Non-skid socks on, SCDs on while patient in bed, pt reaching 1000+ on IS- self motivated.   Bed locked and low, call bell with-in reach. Frequent rounding in place.

## 2020-08-22 NOTE — ANESTHESIA TIME REPORT
Anesthesia Start and Stop Event Times     Date Time Event    8/21/2020 1325 Ready for Procedure     1452 Anesthesia Start     1736 Anesthesia Stop        Responsible Staff  08/21/20    Name Role Begin End    Bryant Santos M.D. Anesth 1452 1736        Preop Diagnosis (Free Text):  Pre-op Diagnosis     ABNORMAL UTERINE BLEEDING, CHRONIC PELVIC PAIN, FIBROIDS        Preop Diagnosis (Codes):    Post op Diagnosis  Abnormal uterine bleeding      Premium Reason  A. 3PM - 7AM    Comments:

## 2020-08-22 NOTE — PROGRESS NOTES
POD# 1 S/P BEVERLY BS POONAM     S:  Pain medication: Adequate.  Flatus: Positive   Voiding: without difficulty   PO intake: Tolerating   Ambulating: ambulatory without difficulty    O:  Pulse: 65  Blood Pressure: (!) 95/58(RN notified)     Temp  Av.9 °C (98.5 °F)  Min: 36.3 °C (97.4 °F)  Max: 37.6 °C (99.6 °F)  Heart: regular rate and rhythm without gallops or murmurs  Lungs: clear bases  Abdomen: flat and soft/nontender / bowelsounds present / incision clean and dry.  CVA: nontender  Extremities: non-tender  Catheter: DC'd    Intake/Output Summary (Last 24 hours) at 2020 1224  Last data filed at 2020 0900  Gross per 24 hour   Intake 6143.33 ml   Output 2805 ml   Net 3338.33 ml     A:  POD# 1 S/P BEVERLY BS POONAM   Stable/progressing well  P:  diet, oral pain medication and DC home tomorrow POD 2  Rx's: Percocet             Colace     Pauline Carpenter MD

## 2020-08-22 NOTE — OP REPORT
OPERATIVE REPORT      PreOp Diagnosis:      1. Abnormal Uterine Bleeding  2. Chronic Pelvic Pain  3. Uterine fibroids      PostOp Diagnosis:      1. same  2. Severe Pelvic Adhesive Disease    Procedure(s):     Total Laparoscopic Hysterectomy Bilateral Salpingectomy converted to   BILATERAL SALPINGECTOMY - Wound Class: Clean  SUPRACERVICAL HYSTERECTOMY; ABDOMINAL   Extensive Lysis of Adhesions  Wound Class: Clean     Surgeons:  Pauline Goel MD     Anesthesiologist: rByant Santos MD    Anesthesia: General     Specimen: Uterus, bilateral fallopian tubes     Estimated blood loss: 150 cc       Urine Output: clear     Findings uterus is not enlarged with a 10 cm fibroid on the left lower uterine segment; bladder and omentum severely adherent to the lower uterine segment. Bilateral FT with evident Filshie clips, bilateral ovaries are normal     Complications: due to the extensive pelvic adhesive disease and consequent lysis of adhesions and conversion to an open case, the case took an additional 1 hour to complete as compared to the usual non-complicated procedure of the same nature.     Patient condition: stable     Description of the Procedure:     After informed consents were obtained, patient was then taken to the operating room where endotracheal anesthesia was  administered without difficulty.  She was then prepped and draped in the normal usual sterile fashion in the lithotomy position with Willie stirrups.  A formal timeout was called prior to the procedure. Preoperative antibiotics were given.  Examination under anesthesia was performed.  A 3-way haywood catheter was placed under sterile technique.  A V-Care uterine manipulator was placed without difficulty and the uterus sounded to 8 cm. The legs were then deflexed in preparation for the abdominal procedure.     About 4 fingerbreadths above the umbilicus,  a 5 mm skin incision was performed after local Marcaine and epinephrine was  infiltrated.  Peritoneal cavity was entered under direct visualization with the use of the Opti-darnell. Insufflation with CO2 gas was then attained at 14 mm Hg at 3 L.   Uterus mobile over-all with good visualization of the posterior uterus and cul-de-sac, however, the anterior lower uterine segment was noted to be densely adherent to the bladder and omental adhesions.     Adhesiolysis were attempted with laparoscopic scissors as well as removal of bilateral tubes via Ligasure were done with excellent hemostasis. Broad ligament on the right side was likewise grasped with Ligasure, cauterized and transected with excellent hemostasis.     Secondary to the uncertainty as to the bowel and bladder involvement at the adhesions at the lower uterine segment, decision was made to convert the procedure to an open abdominal case.      Pfannenstiel incision was made and extended sharply to the rectus fascia.  The fascia was then incised bilaterally with curved Parker scissors and the muscles of the anterior abdominal wall were  in the midline by sharp and blunt dissection.     Peritoneum was grasped, elevated, and entered sharply with the parker scissors.   Patient was placed in Trendelenberg position. The round ligaments on both sides were clamped, transected,   suture ligated with 0 Vicryl after extensive lysis of adhesions sharply using Metzenbaum scissors, enabling proper identification of the structures. Uterine vessels on both side were clamped with Heany, cut and suture-ligated with 0 Vicryl.     The fibroid on the left lower uterine wall was enucleated from its capsule.      At this point, due to the extensive adhesions at the cardinal ligaments and surrounding tissue and the cervix seemed to be 3-4 cm, decision was made to amputate the uterine body from the cervix.    Cervical canal was cauterized with the bovie.  Cervical os was sutured using Vicryl 0 in a continuous running fashion. Excellent hemostasis was  achieved.      Pelvis was irrigated copiously with warm water. Surgi-foam was placed over the cervical stump.   All laparotomy sponges and instruments were removed from the abdomen.    Rectus and peritoneum were reapproximated in the midline.   The fascia was closed in a running 0 Vicryl.  Hemostasis was assured.    Skin was closed subcuticularly with vicryl 4-0. Prineo dressing placed.   Sponges, laps, and needles were correct x2.  The patient tolerated the   procedure well.  There were no other complications noted and the patient was   taken to the PACU, awake and in a stable condition.     Note that due to extensive pelvic adhesions, this case took longer, 1 hour more than usual procedure.      GERTRUDIS CABA MD

## 2020-08-22 NOTE — ANESTHESIA POSTPROCEDURE EVALUATION
Patient: Fouzia Mayen    Procedure Summary     Date: 08/21/20 Room / Location: [facility] ROOM 25 / SURGERY SAME DAY [facility]    Anesthesia Start: 1452 Anesthesia Stop: 1736    Procedures:       SALPINGECTOMY (Bilateral Abdomen)      SUPRACERVICAL ABDOMINAL HYSTERECTOMY, - (N/A Abdomen)      Diagnostic PELVISCOPY (N/A Abdomen) Diagnosis: (ABNORMAL UTERINE BLEEDING, CHRONIC PELVIC PAIN, FIBROIDS)    Surgeon: Pauline Qureshi M.D. Responsible Provider: Bryant Santos M.D.    Anesthesia Type: general ASA Status: 2          Final Anesthesia Type: general  Last vitals  BP   Blood Pressure: (!) 91/54(RN has been notified.)    Temp   36.3 °C (97.4 °F)    Pulse   Pulse: 67   Resp   17    SpO2   99 %      Anesthesia Post Evaluation    Patient location during evaluation: PACU  Patient participation: complete - patient participated  Level of consciousness: awake and alert    Airway patency: patent  Anesthetic complications: no  Cardiovascular status: hemodynamically stable and hypertensive  Respiratory status: acceptable  Hydration status: euvolemic    PONV: none           Nurse Pain Score: 7 (NPRS)

## 2020-08-22 NOTE — OR SURGEON
Immediate Post OP Note    PreOp Diagnosis: 1. Abnormal Uterine Bleeding          2. Chronic Pelvic Pain                                3. Fibroid Uterus     PostOp Diagnosis: same, Extensive Pelvic Adhesive Disease     Procedure(s):    TOTAL LAPAROSCOPIC HYSTERECTOMY CONVERTED TO SUPRACERVICAL HYSTERECTOMY WITH BILATERAL SALPINGECTOMY - Wound Class: Clean  EXTENSIVE LYSIS OF ADHESIONS     Surgeon(s):  AMPARO Moran M.D.    Anesthesiologist/Type of Anesthesia:  Anesthesiologist: Bryant Santos M.D./General    Surgical Staff:  Circulator: Yu Barron R.N.  Relief Circulator: Koki Zapata R.N.; Jennifer Burks R.N.  Scrub Person: Zoie Warner    Specimens removed if any:  ID Type Source Tests Collected by Time Destination   A : uterus, bilateral tubes Tissue Uterus PATHOLOGY SPECIMEN Pauline Qureshi M.D. 8/21/2020  4:20 PM        Estimated Blood Loss: 150 CC    Findings: uterus is not enlarged with a 10 cm fibroid on the left lower uterine segment; bladder and omentum severely adherent to the lower uterine segment. Bilateral FT with evident Filshie clips, bilateral ovaries are normal    Complications: due to the extensive pelvic adhesive disease and consequent lysis of adhesions and conversion to an open case, the case took an additional 1 hour to complete as compared to the usual non-complicated procedure of the same nature.     8/21/2020 6:16 PM Pauline Qureshi M.D.

## 2020-08-22 NOTE — PROGRESS NOTES
2 RN skin check with Anny    Low transverse incision, dressing CDI  Abdominal lap sitex2, no drainage  LLQ transverse incision, no drainage  All other skin intact, no signs of skin breakdown over bony prominences.    Devices in place: haywood catheter, PIV,

## 2020-08-23 VITALS
BODY MASS INDEX: 25.59 KG/M2 | WEIGHT: 149.91 LBS | HEART RATE: 81 BPM | HEIGHT: 64 IN | RESPIRATION RATE: 17 BRPM | SYSTOLIC BLOOD PRESSURE: 106 MMHG | OXYGEN SATURATION: 97 % | TEMPERATURE: 97.7 F | DIASTOLIC BLOOD PRESSURE: 73 MMHG

## 2020-08-23 PROCEDURE — 700102 HCHG RX REV CODE 250 W/ 637 OVERRIDE(OP): Performed by: OBSTETRICS & GYNECOLOGY

## 2020-08-23 PROCEDURE — A9270 NON-COVERED ITEM OR SERVICE: HCPCS | Performed by: OBSTETRICS & GYNECOLOGY

## 2020-08-23 RX ADMIN — OXYCODONE HYDROCHLORIDE AND ACETAMINOPHEN 1 TABLET: 5; 325 TABLET ORAL at 09:23

## 2020-08-23 RX ADMIN — OXYCODONE HYDROCHLORIDE AND ACETAMINOPHEN 1 TABLET: 5; 325 TABLET ORAL at 14:17

## 2020-08-23 RX ADMIN — CELECOXIB 200 MG: 200 CAPSULE ORAL at 05:17

## 2020-08-23 NOTE — PROGRESS NOTES
Discharging Patient home per physician order.  Discharged with daughter.  Demonstrated understanding of discharge instructions, follow up appointments, home medications, prescriptions, home care for surgical wound, and lifting restrictions.  Ambulating without assistance, voiding without difficulty, pain well controlled, tolerating oral medications, oxygen saturation greater than 90% , tolerating diet.   Educational handouts were given and discussed in English and Trinidadian.  Verbalized understanding of discharge instructions and educational handouts.  All questions answered.  Belongings with patient at time of discharge.

## 2020-08-23 NOTE — PROGRESS NOTES
Received report form NOC RN.   Pt A &  X4, denies SOB, O2sat >95.  Large transverse incision covered by clear dressing. Old drainage noted, otherwise dry and intact.   3 lap sites CDI covered with dermabond, edges approximated, open to air.   Tolerating regular diet well.   - BM, +flatus. Abdomen soft, rounded, tender.  + voiding in toilet.  Pt has been well controlled. Pt using abdominal binder as needed for support.   MD rounded on patient this am and discussed plan of care.   Pt updated on expected discharge.     Fall precautions in place, hourly rounding, CNA updated.

## 2020-08-23 NOTE — CARE PLAN
Problem: Communication  Goal: The ability to communicate needs accurately and effectively will improve  Outcome: PROGRESSING AS EXPECTED  Note: Updated on POC, pt able to communicate needs appropriately, and call light is within reach     Problem: Safety  Goal: Will remain free from injury  Outcome: PROGRESSING AS EXPECTED  Goal: Will remain free from falls  Outcome: PROGRESSING AS EXPECTED  Note: Standby assist with a FWW, steady gait, calls appropriately before getting up

## 2020-08-23 NOTE — DISCHARGE SUMMARY
Discharge Summary:      Fouzia Mayen    Admit Date:   2020  Discharge Date:  2020     Admitting diagnosis:  ABNORMAL UTERINE BLEEDING, CHRONIC PELVIC PAIN, FIBROIDS  Abnormal uterine bleeding  Fibroid  Chronic pelvic pain in female  Discharge Diagnosis: Status post BEVERLY, BS; extensive POONAM     History:  Past Medical History:   Diagnosis Date   • Advanced maternal age in multigravida age 40  12/3/2015   • Cigarette smoker quit 4 months ago, has been smoking since age 9 12/3/2015   • History of drug use no meth used for last year 12/3/2015   • Hypertension    • Kidney disease     hx kidney stones      OB History    Para Term  AB Living   4 4 4     4   SAB TAB Ectopic Molar Multiple Live Births             4      # Outcome Date GA Lbr Riley/2nd Weight Sex Delivery Anes PTL Lv   4 Term 16 38w0d  2.83 kg (6 lb 3.8 oz) M CS-Classical Spinal N ABILIO   3 Term 98 37w0d  2.268 kg (5 lb) F CS-Unspec Spinal N ABILIO      Birth Comments: Lekeage of fluids   2 Term 10/22/95 40w0d  3.175 kg (7 lb) M Vag-Spont None N ABILIO      Birth Comments: Denies any complications   1 Term 94 40w0d  2.722 kg (6 lb) F Vag-Spont None N ABILIO      Birth Comments: Denies any complications        Patient has no known allergies.  Patient Active Problem List    Diagnosis Date Noted   • Postpartum care following  delivery 2016   • Chronic hypertension 2016   • History of drug use no meth used for last year 2015   • Cigarette smoker quit 4 months ago, has been smoking since age 9 2015     Hospital Course:   44 y.o. with AUB, CPP, uterine fibroids s/p TLH converted to BEVERLY BS, extensive POONAM.  Patient postop course was unremarkable, with progressive advancement in diet , ambulation and toleration of oral analgesia. Patient without complaints today and desires discharge.      Vitals:    20 1640 20 2004 20 2324 20 0419   BP: (!) 94/63 102/60 (!)  94/58 (!) 95/59   Pulse: 70 63 70 60   Resp: 16 16 16 16   Temp: 36.9 °C (98.5 °F) 36.8 °C (98.3 °F) 36.8 °C (98.2 °F) 36.7 °C (98 °F)   TempSrc: Temporal Temporal Temporal Temporal   SpO2: 100% 99% 96% 97%   Weight:       Height:           Current Facility-Administered Medications   Medication Dose   • Pharmacy Consult Request ...Pain Management Review 1 Each  1 Each   • ondansetron (ZOFRAN) syringe/vial injection 4 mg  4 mg   • dexamethasone (DECADRON) injection 4 mg  4 mg   • diphenhydrAMINE (BENADRYL) injection 25 mg  25 mg   • haloperidol lactate (HALDOL) injection 1 mg  1 mg   • scopolamine (TRANSDERM-SCOP) patch 1 Patch  1 Patch   • docusate sodium (COLACE) capsule 100 mg  100 mg   • senna-docusate (PERICOLACE or SENOKOT S) 8.6-50 MG per tablet 1 Tab  1 Tab   • magnesium hydroxide (MILK OF MAGNESIA) suspension 30 mL  30 mL   • bisacodyl (DULCOLAX) suppository 10 mg  10 mg   • celecoxib (CELEBREX) capsule 200 mg  200 mg   • simethicone (MYLICON) chewable tab 80 mg  80 mg   • oxyCODONE-acetaminophen (PERCOCET) 5-325 MG per tablet 1-2 Tab  1-2 Tab       Exam:  AAOx3  RRR, CTAB  Abdomen: Abdomen soft, non-tender. BS normal. No masses,  No organomegaly  Incision: dry and intact dressing   Extremity: extremities, peripheral pulses and reflexes normal     Labs:  Recent Labs     08/22/20  0641   WBC 14.5*   RBC 4.09*   HEMOGLOBIN 9.3*   HEMATOCRIT 30.8*   MCV 75.3*   MCH 22.7*   MCHC 30.2*   RDW 45.2   PLATELETCT 341   MPV 10.6        Activity:   Discharge to home  Pelvic Rest x 6 weeks    Assessment:  Stable post op      Follow up: Dr Landon Carpenter 1-2 weeks for incision check.      Discharge Meds:   No current outpatient medications on file.       Pauline Qureshi M.D.

## 2020-08-23 NOTE — PROGRESS NOTES
Assessment complete.  A&O x 4. Patient calls appropriately.  Patient ambulates standby assist with a FWW. Bed alarm off.   Patient has 6/10 pain. Pain managed with prescribed medications.  Denies N&V. Tolerating reg diet.  Surgical incision CDI.  + void into toilet, + flatus, + BM, small amounts of diarrhea per pt.  Patient denies SOB.  SCD's off    Review plan with of care with patient. Call light and personal belongings with in reach. Hourly rounding in place. All needs met at this time.

## 2020-08-23 NOTE — PROGRESS NOTES
Discharging Patient home per physician order.  Discharged with daughter.  Demonstrated understanding of discharge instructions, follow up appointments, home medications, prescriptions, home care for surgical wound, and lifting restrictions.  Ambulating without assistance, voiding without difficulty, pain well controlled, tolerating oral medications, oxygen saturation greater than 90% , tolerating diet.   Educational handouts were given and discussed in English and Bulgarian.  Verbalized understanding of discharge instructions and educational handouts.  All questions answered.  Belongings with patient at time of discharge.

## 2020-08-23 NOTE — DISCHARGE INSTRUCTIONS
Discharge Instructions    Discharged to home by car with relative. Discharged via wheelchair, hospital escort: Yes.  Special equipment needed: Not Applicable    Be sure to schedule a follow-up appointment with your primary care doctor or any specialists as instructed.     Discharge Plan:        I understand that a diet low in cholesterol, fat, and sodium is recommended for good health. Unless I have been given specific instructions below for another diet, I accept this instruction as my diet prescription.   Other diet: regular    Special Instructions: None    · Is patient discharged on Warfarin / Coumadin?   No     Depression / Suicide Risk    As you are discharged from this Critical access hospital facility, it is important to learn how to keep safe from harming yourself.    Recognize the warning signs:  · Abrupt changes in personality, positive or negative- including increase in energy   · Giving away possessions  · Change in eating patterns- significant weight changes-  positive or negative  · Change in sleeping patterns- unable to sleep or sleeping all the time   · Unwillingness or inability to communicate  · Depression  · Unusual sadness, discouragement and loneliness  · Talk of wanting to die  · Neglect of personal appearance   · Rebelliousness- reckless behavior  · Withdrawal from people/activities they love  · Confusion- inability to concentrate     If you or a loved one observes any of these behaviors or has concerns about self-harm, here's what you can do:  · Talk about it- your feelings and reasons for harming yourself  · Remove any means that you might use to hurt yourself (examples: pills, rope, extension cords, firearm)  · Get professional help from the community (Mental Health, Substance Abuse, psychological counseling)  · Do not be alone:Call your Safe Contact- someone whom you trust who will be there for you.  · Call your local CRISIS HOTLINE 221-7115 or 937-806-5164  · Call your local Children's Mobile  Crisis Response Team [place] (418) 208-2827 or www.[employer]  · Call the toll free National Suicide Prevention Hotlines   · National Suicide Prevention Lifeline 387-945-EMDG (1176)  · National backstitch Line Network 800-SUICIDE (298-1961)        Malissa Patient Education 2020 [employer]      Información sobre la histerectomía  Hysterectomy Information    La histerectomía es adele cirugía que se realiza para extirpar el útero. Después de la cirugía, no volverá a tener períodos menstruales. Además, no podrá quedar embarazada.  Motivos para realizar esta cirugía  Podrían hacerle esta cirugía en los siguientes casos:  · Tiene sangrado de la vagina:  ? Que no es normal.  ? Que no desaparece o no para.  · Tiene dolor karine ximena tiempo (crónico) en la parte baja del vientre (guicho pélvica).  · El tejido que reviste internamente al útero se desarrolla fuera del mismo (endometriosis).  · El tejido que reviste internamente el útero crece en el músculo del útero (adenomiosis).  · El útero  hacia la vagina (prolapso).  · Tiene un bulto en el útero que le causa problemas (fibromas uterinos).  · Presenta células que podrían convertirse en cáncer (células precancerosas).  · Tiene cáncer de útero o de dariela.  Tipos de histerectomía  Hay 3 tipos de histerectomía. Según el tipo, en la cirugía:  · Se extirpará solo la parte superior del útero (supracervical).  · Se extirpará el útero y el dariela uterino (total).  · Se extirpará el útero, el dariela uterino y el tejido que sostiene el útero en leiva lugar (radical).  Formas en que se puede realizar adele histerectomía  Esta cirugía se puede realizar de alguna de las siguientes maneras:  · Se realiza un hannah (incisión) en el vientre (abdomen). A través del hannah se extrae el útero.  · Se hace adele incisión en la vagina. A través del hannah se extrae el útero.  · Se hacen sapna o cuatro incisiones en el abdomen. A través de bashir de los cuevas, se coloca un dispositivo con adele  cámara. El útero se corta en partes y se extrae a través de unos cuevas realizados en la vagina.  · Se hacen sapna o cuatro incisiones en el abdomen. A través de bashir de los cuevas, se coloca un dispositivo con adele cámara. El útero se extrae a través de la vagina.  · Se hacen sapna o cuatro incisiones en el abdomen. Adele computadora permite controlar el instrumental quirúrgico. El útero se corta en trozos pequeños. Las secciones se retiran a través de los cuevas o por la vagina.  Hable con leiva médico acerca de la manera más adecuada para usted.  Riesgos de histerectomía  Por lo general, esta cirugía es dorsey. Sin embargo, pueden presentarse problemas, por ejemplo:  · Hemorragia.  · Recibir lexi donada (transfusión).  · Coágulos de lexi.  · Infección.  · Daños a otras estructuras u otros órganos.  · Reacciones alérgicas.  · Necesitar otro tipo de cirugía.  Qué esperar después de la cirugía  · Le darán medicamentos para el dolor.  · Será necesario que permanezca en el hospital entre 1 y 2 días.  · Siga las indicaciones de leiva médico respecto de lo siguiente:  ? Realizar actividad física.  ? Conducir.  ? Qué actividades son seguras para usted.  · Necesitará que alguien permanezca con usted en leiva casa karine 3 a 5 días.  · Tendrá que gita al médico después de 2 a 4 semanas.  · Probablemente tenga acaloramientos, sudoración nocturna y dificultades para dormir.  · Es posible que tenga que hacerse un Papanicolaou si la causa de la cirugía estaba relacionada con cáncer. Hable con el médico sobre la frecuencia con la que debe hacerse el Papanicolaou.  Preguntas para hacerle al médico  · ¿Necesito hacerme esta cirugía? ¿Necesito otras opciones de tratamiento?  · ¿Cuáles son mis opciones para esta cirugía?  · ¿Qué se debe extirpar?  · ¿Cuáles son los riesgos?  · ¿Cuáles son los beneficios?  · ¿Cuánto tiempo deberé permanecer en el hospital?  · ¿Cuánto tiempo llevará la recuperación?  · ¿Qué síntomas pueden aparecer después del  procedimiento?  Resumen  · La histerectomía es adele cirugía que se realiza para extirpar el útero. Después de la cirugía, no volverá a tener períodos menstruales. Además, no podrá quedar embarazada.  · Hable con leiva médico acerca de cuál es el mejor tipo de histerectomía para usted.  Esta información no tiene sanjeev fin reemplazar el consejo del médico. Asegúrese de hacerle al médico cualquier pregunta que tenga.  Document Released: 06/18/2013 Document Revised: 08/14/2018 Document Reviewed: 08/14/2018  Elsevier Patient Education © 2020 [employer]      Hysterectomy Information    A hysterectomy is a surgery to remove your uterus. After surgery, you will no longer have periods. Also, you will no longer be able to get pregnant.  Reasons for this surgery  You may have this surgery if:  · You have bleeding in your vagina:  ? That is not normal.  ? That does not stop, or that keeps coming back.  · You have long-term (chronic) pain in your lower belly (pelvic area).  · The lining of your uterus grows outside of the uterus (endometriosis).  · The lining of your uterus grows in the muscle of the uterus (adenomyosis).  · Your uterus falls down into your vagina (prolapse).  · You have a growth in your uterus that causes problems (uterine fibroids).  · You have cells that could turn into cancer (precancerous cells).  · You have cancer of the uterus or cervix.  Types of hysterectomies  There are 3 types of hysterectomies. Depending on the type, the surgery will:  · Remove the top part of the uterus (supracervical).  · Remove the uterus and the cervix (total).  · Remove the uterus, cervix, and tissue that holds the uterus in place (radical).  Ways a hysterectomy can be done  This surgery may be done in one of these ways:  · A cut (incision) is made in the belly (abdomen). The uterus is taken out through the cut.  · A cut is made in the vagina. The uterus is taken out through the cut.  · Three or four cuts are made in the belly. A  device with a camera is put through one of the cuts. The uterus is cut into pieces and taken out through the cuts or the vagina.  · Three or four cuts are made in the belly. A device with a camera is put through one of the cuts. The uterus is taken out through the vagina.  · Three or four cuts are made in the belly. A computer helps control the surgical tools. The uterus is cut into small pieces. The pieces are taken out through the cuts or through the vagina.  Talk with your doctor about which way is best for you.  Risks of hysterectomy  Generally, this surgery is safe. However, problems can happen, including:  · Bleeding.  · Needing donated blood (transfusion).  · Blood clots.  · Infection.  · Damage to other structures or organs.  · Allergic reactions.  · Needing to switch to a different type of surgery.  What to expect after surgery  · You will be given pain medicine.  · You will need to stay in the hospital for 1-2 days.  · Follow your doctor's instructions about:  ? Exercising.  ? Driving.  ? What activities are safe for you.  · You will need to have someone with you at home for 3-5 days.  · You will need to see your doctor after 2-4 weeks.  · You may get hot flashes, have night sweats, and have trouble sleeping.  · You may need to have Pap tests if your surgery was related to cancer. Talk with your doctor about how often you need Pap tests.  Questions to ask your doctor  · Do I need this surgery? Do I have other treatment options?  · What are my options for this surgery?  · What needs to be removed?  · What are the risks?  · What are the benefits?  · How long will I need to stay in the hospital?  · How long will I need to recover?  · What symptoms can I expect after the procedure?  Summary  · A hysterectomy is a surgery to remove your uterus. After surgery, you will no longer have periods. Also, you will no longer be able to get pregnant.  · Talk with your doctor about which type of hysterectomy is best for  you.  This information is not intended to replace advice given to you by your health care provider. Make sure you discuss any questions you have with your health care provider.  Document Released: 03/11/2013 Document Revised: 02/20/2020 Document Reviewed: 03/20/2018  Elsevier Patient Education © 2020 [employer]        Laparoscopically Assisted Vaginal Hysterectomy  A laparoscopically assisted vaginal hysterectomy (LAVH) is a surgical procedure to remove the uterus and cervix. Sometimes, the ovaries and fallopian tubes are also removed. This surgery may be done to treat problems such as:  Noncancerous growths in the uterus (uterine fibroids) that cause symptoms.  A condition that causes the lining of the uterus to grow in other areas (endometriosis).  Problems with pelvic support.  Cancer of the cervix, ovaries, uterus, or tissue that lines the uterus (endometrium).  Excessive (dysfunctional) uterine bleeding.  During an LAVH, some of the surgical removal is done through the vagina, and the rest is done through a few small incisions in the abdomen. This technique may be an option for women who are not able to have a vaginal hysterectomy.  Tell a health care provider about:  Any allergies you have.  All medicines you are taking, including vitamins, herbs, eye drops, creams, and over-the-counter medicines.  Any problems you or family members have had with anesthetic medicines.  Any blood disorders you have.  Any surgeries you have had.  Any medical conditions you have.  Whether you are pregnant or may be pregnant.  What are the risks?  Generally, this is a safe procedure. However, problems may occur, including:  Infection.  Bleeding.  Allergic reactions to medicines.  Damage to other structures or organs.  Difficulty breathing.  What happens before the procedure?  Staying hydrated  Follow instructions from your health care provider about hydration, which may include:  Up to 2 hours before the procedure - you may  continue to drink clear liquids, such as water, clear fruit juice, black coffee, and plain tea.  Eating and drinking restrictions  Follow instructions from your health care provider about eating and drinking, which may include:  8 hours before the procedure - stop eating heavy meals or foods such as meat, fried foods, or fatty foods.  6 hours before the procedure - stop eating light meals or foods, such as toast or cereal.  6 hours before the procedure - stop drinking milk or drinks that contain milk.  2 hours before the procedure - stop drinking clear liquids.  Medicines  Ask your health care provider about:  Changing or stopping your regular medicines. This is especially important if you are taking diabetes medicines or blood thinners.  Taking over-the-counter medicines, vitamins, herbs, and supplements.  Taking medicines such as aspirin and ibuprofen. These medicines can thin your blood. Do not take these medicines unless your health care provider tells you to take them.  You may be asked to take a medicine to empty your colon (bowel preparation).  You may be given antibiotic medicine to help prevent infection.  General instructions  Plan to have someone take you home from the hospital or clinic.  Ask your health care provider how your surgical site will be marked or identified.  You may be asked to shower with a germ-killing soap.  Do not use any products that contain nicotine or tobacco, such as cigarettes and e-cigarettes. These can delay healing after surgery. If you need help quitting, ask your health care provider.  What happens during the procedure?  To lower your risk of infection:  Your health care team will wash or sanitize their hands.  Hair may be removed from the surgical area.  Your skin will be washed with soap.  An IV will be inserted into one of your veins.  You will be given one or more of the following:  A medicine to help you relax (sedative).  A medicine to make you fall asleep (general  anesthetic).  You may have a flexible tube (catheter) put into your bladder to drain urine.  You may have a tube put through your nose or mouth down into your stomach (nasogastric tube). The nasogastric tube will remove digestive fluids and prevent nausea and vomiting.  Tight-fitting (compression) stockings will be placed on your legs to promote circulation.  Three or four small incisions will be made in your abdomen. An incision will also be made in your vagina.  Probes and tools will be inserted into the small incisions. The uterus and cervix (and possibly the ovaries and fallopian tubes) will be removed through your vagina as well as through the small incisions that were made in the abdomen.  The incisions will then be closed with stitches (sutures).  The procedure may vary among health care providers and hospitals.  What happens after the procedure?  Your blood pressure, heart rate, breathing rate, and blood oxygen level will be monitored until the medicines you were given have worn off.  You may have a liquid diet at first. You will most likely return to your usual diet the day after surgery.  You will still have the urinary catheter in place. It will likely be removed the day after surgery.  You may have to wear compression stockings. These stockings help to prevent blood clots and reduce swelling in your legs.  You will be encouraged to walk as soon as possible. You will also use a device or do breathing exercises to keep your lungs clear.  Do not drive for 24 hours if you were given a sedative.  Summary  A laparoscopically assisted vaginal hysterectomy (LAVH) is a surgical procedure to remove the uterus and cervix, and sometimes the ovaries and fallopian tubes.  Follow instructions from your health care provider about eating and drinking before the procedure.  During an LAVH, some of the surgical removal is done through the vagina, and the rest is done through a few small incisions in the abdomen.  This  information is not intended to replace advice given to you by your health care provider. Make sure you discuss any questions you have with your health care provider.  Document Released: 12/06/2012 Document Revised: 02/10/2020 Document Reviewed: 03/15/2018  Elsevier Patient Education © 2020 Elsevier Inc.

## 2021-12-02 ENCOUNTER — PHYSICAL THERAPY (OUTPATIENT)
Dept: PHYSICAL THERAPY | Facility: REHABILITATION | Age: 46
End: 2021-12-02
Attending: FAMILY MEDICINE
Payer: COMMERCIAL

## 2021-12-02 DIAGNOSIS — M54.50 LOW BACK PAIN, UNSPECIFIED BACK PAIN LATERALITY, UNSPECIFIED CHRONICITY, UNSPECIFIED WHETHER SCIATICA PRESENT: ICD-10-CM

## 2021-12-02 PROCEDURE — 97161 PT EVAL LOW COMPLEX 20 MIN: CPT

## 2021-12-02 PROCEDURE — 97110 THERAPEUTIC EXERCISES: CPT

## 2021-12-02 NOTE — OP THERAPY EVALUATION
Outpatient Physical Therapy  INITIAL EVALUATION    [facility] Physical Therapy [facility]  [address]  [address]  [place] [address]  Phone:  [phone]  Fax:  [phone]    Date of Evaluation: 12/02/2021    Patient: Fouzia Mayen  YOB: 1975  MRN: [id number]     Referring Provider: Denia Ribeiro M.D.  [address] [place] [address]   Referring Diagnosis Low back pain, unspecified [M54.50]     Time Calculation  Start time: 1115  Stop time: 1201 Time Calculation (min): 46 minutes         Chief Complaint: Back Problem    Visit Diagnoses     ICD-10-CM   1. Low back pain, unspecified back pain laterality, unspecified chronicity, unspecified whether sciatica present  M54.50       Date of onset of impairment: 9/2/2021    Subjective:   History of Present Illness:     Mechanism of injury:  Ipad  Omid [id number]    Three months ago, pt reporting she was working at a job where she was standing a lot and noticed an increase in back pain. She has since switched jobs and back pain has improved. Pt reports she gets more pain when she stands for a long time. Pain is mid to upper back. Reports pain came on little by little. Pt reports she has some knee pain too. At this moment, in sitting, she is not having pain.   Treatments:     Treatment Comments:  Denies numbness and tingling. No night pain.   Patient Goals:     Patient goals for therapy:  Decreased pain      Past Medical History:   Diagnosis Date   • Advanced maternal age in multigravida age 40  12/3/2015   • Cigarette smoker quit 4 months ago, has been smoking since age 9 12/3/2015   • History of drug use no meth used for last year 12/3/2015   • Hypertension    • Kidney disease     hx kidney stones      Past Surgical History:   Procedure Laterality Date   • PB LAP,DIAGNOSTIC ABDOMEN N/A 8/21/2020    Procedure: Diagnostic PELVISCOPY;  Surgeon: Pauline Qureshi M.D.;  Location: SURGERY SAME DAY [facility]  ORS;  Service: Labor and Delivery   • PB TOTAL ABDOM HYSTERECTOMY N/A 2020    Procedure: SUPRACERVICAL ABDOMINAL HYSTERECTOMY, -;  Surgeon: Pauline Qureshi M.D.;  Location: SURGERY SAME DAY [facility] ORS;  Service: Labor and Delivery   • SALPINGECTOMY Bilateral 2020    Procedure: SALPINGECTOMY;  Surgeon: Pauline Qureshi M.D.;  Location: SURGERY SAME DAY [facility] ORS;  Service: Labor and Delivery   • REPEAT C SECTION W TUBAL LIGATION N/A 2016    Procedure: REPEAT C SECTION WITH  bilateral TUBAL LIGATION;  Surgeon: Savanna Baig M.D.;  Location: LABOR AND DELIVERY;  Service:    • PRIMARY C SECTION  1998    [facility]     Social History     Tobacco Use   • Smoking status: Former Smoker     Packs/day: 0.50     Years: 15.00     Pack years: 7.50     Quit date:      Years since quittin.9   • Smokeless tobacco: Never Used   • Tobacco comment: Quit smoking 5 months ago. Smoking since 9 yrs  of age   Substance Use Topics   • Alcohol use: No     Comment: Socially     Family and Occupational History     Socioeconomic History   • Marital status: Single     Spouse name: Not on file   • Number of children: Not on file   • Years of education: Not on file   • Highest education level: Not on file   Occupational History   • Not on file       Objective     Hip Screen   Hip range of motion within functional limits.  Hip strength within functional limits  Hip joint mobility within functional limits    Active Range of Motion     Lumbar   Flexion: within functional limits  Extension: within functional limits  Left lateral flexion: within functional limits  Right lateral flexion: within functional limits  Left rotation: within functional limits  Right rotation: within functional limits    Additional Active Range of Motion Details  Pain at thoracic spine during rotation L and R, but moreso to R.     Joint Play   Spine     [place]        T1: WFL       T2: WFL        T3: WFL       T4: painful       T5: painful       T6: painful       T7: painful       T8: painful       T10: WFL       T11: WFL       T12: WFL       L1: WFL       L2: WFL       L3: WFL       L4: WFL       L5: WFL       S1: WFL    Unilateral PA Glide (left)        T10: WFL       T11: WFL       T12: WFL       L1: WFL       L2: WFL       L3: WFL       L4: WFL       L5: WFL       S1: WFL    Unilateral PA Glide (right)        T1: WFL       T2: WFL       T3: WFL       T4: painful       T5: painful       T6: painful       T7: painful       T8: painful       T10: WFL       T11: WFL       T12: WFL       L1: WFL       L2: WFL       L3: WFL       L4: WFL       L5: WFL            Therapeutic Exercises (CPT 73361):     1. Pelvic Rotation supine and sitting, x10, no pain      Therapeutic Exercise Summary: HEP: Bridges, child's pose back stretch, child's pose with rotation R/L, BKFO, cat/cow, corner pec stretch. With demo today, slight discomfort to thoracic spine with rotation, especially R. Did not increase; present.      Time-based treatments/modalities:    Physical Therapy Timed Treatment Charges  Therapeutic exercise minutes (CPT 27893): 20 minutes      Assessment, Response and Plan:   Impairments: difficulty performing job, impaired functional mobility, lacks appropriate home exercise program and pain with function    Assessment details:  Pt is a 47yo F seeking treatment for mid-back pain while standing for long periods. Pt reports she works 12hr shifts and at her previous job, was required to stand almost the entire time. This resulted in back pain. While pt presents without ROM restriction, she is limited by pain into R rotation as well as flexion/extension of thoracic spine. Upon palpation, spinous processes are tender and reproduce her pain. As pt is rather flexible, she will benefit from strengthening to improve muscle protection of joints, which is likely contributing to her difficulty maintaining pain-free standing.  Pt will benefit from skilled intervention for abdominal, paraspinal, and gross trunk strengthening to reduce her pain and prevent further impairment.   Prognosis: good    Goals:   Short Term Goals:   1. Pt will perform reaching across her body in thoracic rotation without pain in order to improve ability to grasp objects not direction in front of her at home and work.   2. Pt will be able to perform standing UE resistance exercise for 15mins without pain to back to improve ability to tolerate shifts at work without pain.   Short term goal time span:  2-4 weeks      Long Term Goals:    1. Pt will be able to reach behind her to grasp objects without pain to improve ability to function at work and home.   2. Pt will be able to stand for 40mins while performing UE activity without pain to improve her ability to tolerate standing shifts at work and ADLs and home.   Long term goal time span:  4-6 weeks    Plan:   Therapy options:  Physical therapy treatment to continue  Planned therapy interventions:  Functional Training, Self Care (CPT 54956), E Stim Unattended (CPT 49447), Manual Therapy (CPT 25690), Mechanical Traction (CPT 43399), Neuromuscular Re-education (CPT 64458), Therapeutic Activities (CPT 19514) and Therapeutic Exercise (CPT 89699)  Frequency:  1x week  Duration in weeks:  8  Discussed with:  Patient      Functional Assessment Used  Ayan Tulio Low Back Pain and Disability Score: 4.17     Referring provider co-signature:  I have reviewed this plan of care and my co-signature certifies the need for services.    Certification Period: 12/02/2021 to  01/27/22    Physician Signature: ________________________________ Date: ______________

## 2021-12-08 ENCOUNTER — PHYSICAL THERAPY (OUTPATIENT)
Dept: PHYSICAL THERAPY | Facility: REHABILITATION | Age: 46
End: 2021-12-08
Attending: FAMILY MEDICINE
Payer: COMMERCIAL

## 2021-12-08 DIAGNOSIS — M54.50 LOW BACK PAIN, UNSPECIFIED BACK PAIN LATERALITY, UNSPECIFIED CHRONICITY, UNSPECIFIED WHETHER SCIATICA PRESENT: ICD-10-CM

## 2021-12-08 PROCEDURE — 97110 THERAPEUTIC EXERCISES: CPT

## 2021-12-08 NOTE — OP THERAPY DAILY TREATMENT
Outpatient Physical Therapy  DAILY TREATMENT     [facility] Physical Therapy [address]  [address]  [place] [address]  Phone:  [phone]  Fax:  [phone]    Date: 12/08/2021    Patient: Fouzia Mayen  YOB: 1975  MRN: [id number]     Time Calculation    Start time: 1416  Stop time: 1459 Time Calculation (min): 43 minutes         Chief Complaint: Back Problem    Visit #: 2    SUBJECTIVE:  Pt reporting that after eval she had some numbness and tingling in feet and pain in her back.   ipad  [id number] Elvis    OBJECTIVE:  Current objective measures:           Therapeutic Exercises (CPT 45918):     1. Pelvic Rotation supine and sitting, x10, no pain    3. Quadruped , hip ext x15, UE ext x15, discomfort with UE lifting, ritesh on R    4. Foam Roll (1/2 white), horiz abd, alt flexion, flex elbow flap, x15, pain during R     5. Rows turq TB, x15    6. BKFO, x10    7. Bridges, x10    8. Single leg lifts , x15, cues for TA activation    9. Push up plus, against wall x15, no pain    10. TB punches, x10 B, no pain      Therapeutic Exercise Summary: HEP: Bridges, child's pose back stretch, child's pose with rotation R/L, BKFO, cat/cow, corner pec stretch.Rows with TB.      Time-based treatments/modalities:    Physical Therapy Timed Treatment Charges  Therapeutic exercise minutes (CPT 33537): 43 minutes        ASSESSMENT:   Response to treatment: Pt with pain to her thoracic spine during rotation but with reduction in pain during stabilization exercises for scapula and ribs. She will benefit from further intervention to improve strngth and reduce pain.     PLAN/RECOMMENDATIONS:   Plan for treatment: therapy treatment to continue next visit.  Planned interventions for next visit: continue with current treatment.

## 2021-12-16 ENCOUNTER — APPOINTMENT (OUTPATIENT)
Dept: PHYSICAL THERAPY | Facility: REHABILITATION | Age: 46
End: 2021-12-16
Attending: FAMILY MEDICINE
Payer: COMMERCIAL

## 2021-12-16 NOTE — OP THERAPY DAILY TREATMENT
"  Outpatient Physical Therapy  DAILY TREATMENT     [facility] Physical [address]  [address]  [place] [address]  Phone:  [phone]  Fax:  [phone]    Date: 12/16/2021    Patient: Fouzia Mayen  YOB: 1975  MRN: [id number]     Time Calculation                   Chief Complaint: No chief complaint on file.    Visit #: 3    SUBJECTIVE:  ***    OBJECTIVE:  Current objective measures: ***          Therapeutic Exercises (CPT 66968):     1. Pelvic Rotation supine and sitting, x10, no pain    3. Quadruped , hip ext x15, UE ext x15, discomfort with UE lifting, ritesh on R    4. Foam Roll (1/2 white), horiz abd, alt flexion, flex elbow flap, x15, pain during R     5. Rows turq TB, x15    6. BKFO, x10    7. Bridges, x10    8. Single leg lifts , x15, cues for TA activation    9. Push up plus, against wall x15, no pain    10. TB punches, x10 B, no pain      Therapeutic Exercise Summary: HEP: Bridges, child's pose back stretch, child's pose with rotation R/L, BKFO, cat/cow, corner pec stretch.Rows with TB.      Time-based treatments/modalities:           Pain rating (1-10) before treatment:  {PAIN NUMBERS_1-10:55325}  Pain rating (1-10) after treatment:  {PAIN NUMBERS_1-10:04659}    ASSESSMENT:   Response to treatment: ***    PLAN/RECOMMENDATIONS:   Plan for treatment: {AMB OP THERAPY - THERAPY PLAN:298590578::\"therapy treatment to continue next visit\"}.  Planned interventions for next visit: {PT PLANNED THERAPY INTERVENTIONS:812527847::\"continue with current treatment\"}.       "

## 2021-12-22 ENCOUNTER — APPOINTMENT (OUTPATIENT)
Dept: PHYSICAL THERAPY | Facility: REHABILITATION | Age: 46
End: 2021-12-22
Attending: FAMILY MEDICINE
Payer: COMMERCIAL

## 2021-12-22 NOTE — OP THERAPY DAILY TREATMENT
"  Outpatient Physical Therapy  DAILY TREATMENT     [facility] Physical [address]  [address]  [place] [address]  Phone:  [phone]  Fax:  [phone]    Date: 12/22/2021    Patient: Fouzia Mayen  YOB: 1975  MRN: [id number]     Time Calculation                   Chief Complaint: No chief complaint on file.    Visit #: 3    SUBJECTIVE:  ***    OBJECTIVE:  Current objective measures: ***          Therapeutic Exercises (CPT 66177):     1. Pelvic Rotation supine and sitting, x10, no pain    3. Quadruped , hip ext x15, UE ext x15, discomfort with UE lifting, ritesh on R    4. Foam Roll (1/2 white), horiz abd, alt flexion, flex elbow flap, x15, pain during R     5. Rows turq TB, x15    6. BKFO, x10    7. Bridges, x10    8. Single leg lifts , x15, cues for TA activation    9. Push up plus, against wall x15, no pain    10. TB punches, x10 B, no pain      Therapeutic Exercise Summary: HEP: Bridges, child's pose back stretch, child's pose with rotation R/L, BKFO, cat/cow, corner pec stretch.Rows with TB.      Time-based treatments/modalities:           Pain rating (1-10) before treatment:  {PAIN NUMBERS_1-10:47213}  Pain rating (1-10) after treatment:  {PAIN NUMBERS_1-10:81129}    ASSESSMENT:   Response to treatment: ***    PLAN/RECOMMENDATIONS:   Plan for treatment: {AMB OP THERAPY - THERAPY PLAN:206598996::\"therapy treatment to continue next visit\"}.  Planned interventions for next visit: {PT PLANNED THERAPY INTERVENTIONS:576625708::\"continue with current treatment\"}.       "

## 2021-12-30 ENCOUNTER — PHYSICAL THERAPY (OUTPATIENT)
Dept: PHYSICAL THERAPY | Facility: REHABILITATION | Age: 46
End: 2021-12-30
Attending: FAMILY MEDICINE
Payer: COMMERCIAL

## 2021-12-30 DIAGNOSIS — M54.50 LOW BACK PAIN, UNSPECIFIED BACK PAIN LATERALITY, UNSPECIFIED CHRONICITY, UNSPECIFIED WHETHER SCIATICA PRESENT: ICD-10-CM

## 2021-12-30 PROCEDURE — 97110 THERAPEUTIC EXERCISES: CPT

## 2021-12-30 NOTE — OP THERAPY DAILY TREATMENT
Outpatient Physical Therapy  DAILY TREATMENT     [facility] Physical [address]  [address]  [place] [address]  Phone:  [phone]  Fax:  [phone]    Date: 12/30/2021    Patient: Fouzia Mayen  YOB: 1975  MRN: [id number]     Time Calculation    Start time: 1032  Stop time: 1117 Time Calculation (min): 45 minutes         Chief Complaint: Back Problem    Visit #: 3    SUBJECTIVE:  Ipad : [id number] Rolf    Pt was doing well but unfortunately, she slipped on the ice and fell two days ago. R shoulder and back painful.        OBJECTIVE:  Current objective measures:           Therapeutic Exercises (CPT 86411):     1. Pelvic Rotation supine and sitting, x10, no pain    2. Supine HS curl on ball, x10    3. Quadruped     4. Foam Roll (1/2 white), horiz abd, alt flexion, flex elbow flap, x15, generalized discomfort    5. Rows turq TB, x15    6. BKFO, x10B    7. Bridges, x10    8. Single leg lifts , x10 B, cues for TA activation; pt reporting good pain with this one    9. Push up plus, against wall x15    10. TB punches in supine, x10 B blue TB    11. Wall valerie, x10, cues for TA, neck, and shoulder; pt reported exercise to be quite difficult.       Therapeutic Exercise Summary: HEP: Bridges, child's pose back stretch, child's pose with rotation R/L, BKFO, cat/cow, corner pec stretch.Rows with TB.    Added heat to supine exercises for comfort.     *NEXT TIME* cart mechanics and engaging gluts/abs to protect back      Time-based treatments/modalities:    Physical Therapy Timed Treatment Charges  Therapeutic exercise minutes (CPT 48296): 45 minutes          ASSESSMENT:   Response to treatment: Pt reporting improved discomfort after intervention today with addition of heat. She will require further intervention to address mechanics for her work as she does appear to be moving into excessive lumbar extension versus utilizing abdominals and gluts.      PLAN/RECOMMENDATIONS:   Plan for treatment: therapy treatment to continue next visit.  Planned interventions for next visit: continue with current treatment.

## 2022-01-20 ENCOUNTER — HOSPITAL ENCOUNTER (OUTPATIENT)
Facility: MEDICAL CENTER | Age: 47
End: 2022-01-20
Attending: PHYSICIAN ASSISTANT
Payer: COMMERCIAL

## 2022-01-20 ENCOUNTER — OFFICE VISIT (OUTPATIENT)
Dept: URGENT CARE | Facility: CLINIC | Age: 47
End: 2022-01-20
Payer: COMMERCIAL

## 2022-01-20 VITALS
WEIGHT: 160 LBS | BODY MASS INDEX: 27.31 KG/M2 | HEIGHT: 64 IN | DIASTOLIC BLOOD PRESSURE: 70 MMHG | OXYGEN SATURATION: 97 % | SYSTOLIC BLOOD PRESSURE: 116 MMHG | HEART RATE: 82 BPM | RESPIRATION RATE: 16 BRPM | TEMPERATURE: 97.7 F

## 2022-01-20 DIAGNOSIS — J06.9 VIRAL URI WITH COUGH: ICD-10-CM

## 2022-01-20 DIAGNOSIS — J02.9 PHARYNGITIS, UNSPECIFIED ETIOLOGY: ICD-10-CM

## 2022-01-20 DIAGNOSIS — H69.91 DYSFUNCTION OF RIGHT EUSTACHIAN TUBE: ICD-10-CM

## 2022-01-20 LAB — COVID ORDER STATUS COVID19: NORMAL

## 2022-01-20 PROCEDURE — U0003 INFECTIOUS AGENT DETECTION BY NUCLEIC ACID (DNA OR RNA); SEVERE ACUTE RESPIRATORY SYNDROME CORONAVIRUS 2 (SARS-COV-2) (CORONAVIRUS DISEASE [COVID-19]), AMPLIFIED PROBE TECHNIQUE, MAKING USE OF HIGH THROUGHPUT TECHNOLOGIES AS DESCRIBED BY CMS-2020-01-R: HCPCS

## 2022-01-20 PROCEDURE — U0005 INFEC AGEN DETEC AMPLI PROBE: HCPCS

## 2022-01-20 PROCEDURE — 87081 CULTURE SCREEN ONLY: CPT

## 2022-01-20 PROCEDURE — 99203 OFFICE O/P NEW LOW 30 MIN: CPT | Performed by: PHYSICIAN ASSISTANT

## 2022-01-20 RX ORDER — LISINOPRIL 20 MG/1
20 TABLET ORAL
COMMUNITY
Start: 2022-01-19

## 2022-01-20 ASSESSMENT — ENCOUNTER SYMPTOMS
DIARRHEA: 0
SHORTNESS OF BREATH: 0
PALPITATIONS: 0
FEVER: 0
NAUSEA: 0
SORE THROAT: 1
COUGH: 1
EYE PAIN: 0
BLURRED VISION: 0
HEADACHES: 1
VOMITING: 0
CHILLS: 0
SINUS PAIN: 0
MYALGIAS: 0
DIZZINESS: 0
ABDOMINAL PAIN: 0

## 2022-01-20 NOTE — PATIENT INSTRUCTIONS
Disfunción de la trompa de Vin  Eustachian Tube Dysfunction    Disfunción de la trompa de Vin hace referencia a adele afección en la que se forma adele obstrucción en el pasaje estrecho que conecta el oído medio con la parte posterior de la nariz (trompa de Vin). La trompa de Vin regula la presión del aire en el oído medio al permitir que el aire se mueva entre el oído y la nariz. También ayuda a drenar el líquido del espacio del oído medio.  La disfunción de la trompa de Vin puede afectar a bashir o ambos oídos. Cuando la trompa de Vin no funciona correctamente, la presión de aire, de líquido, o ambas pueden acumularse en el oído medio.  ¿Cuáles son las causas?  Esta afección se produce cuando la trompa de Vin se obstruye o no puede abrirse normalmente. Las causas más frecuentes de esta afección incluyen las siguientes:  · Infecciones en los oídos.  · Resfriados y otras infecciones que afectan la nariz, la boca y la garganta (vías respiratorias superiores).  · Alergias.  · Irritación por el humo del cigarrillo.  · Irritación por el retroceso de ácido estomacal hacia el esófago (reflujo gastroesofágico). El esófago es un conducto que transporta los alimentos y bebidas desde la boca al estómago.  · Cambios súbitos en la presión del aire, sanjeev cuando baja el avión o cuando se practica buceo.  · Crecimientos anormales en la nariz o la garganta, por ejemplo:  ? Crecimientos en el interior de la nariz (pólipos nasales).  ? Crecimiento anormal de células (tumores).  ? Agrandamiento de tejido en la parte posterior de la garganta (adenoides).  ¿Qué incrementa el riesgo?  Es más probable que contraiga esta afección si:  · Fuma.  · Tiene sobrepeso.  · Es un isamar que tiene:  ? Ciertos defectos congénitos de la boca, sanjeev fisura palatina.  ? Amígdalas o adenoides grandes.  ¿Cuáles son los signos o síntomas?  Los síntomas frecuentes de esta afección incluyen los siguientes:  · Sensación de que  el oído está lleno.  · Dolor de oído.  · Ruidos de chasquidos o estallidos en el oído.  · Zumbidos en el oído.  · Pérdida auditiva.  · Pérdida del equilibrio.  · Mareos.  Los síntomas pueden empeorar cuando la presión del aire que lo rodea cambia, sanjeev cuando viaja a adele guicho de gran elevación, vuela en avión o practica buceo.  ¿Cómo se diagnostica?  Esta afección se puede diagnosticar en función de lo siguiente:  · Bianka síntomas.  · Un examen físico de los oídos, la nariz y la garganta.  · Estudios, sanjeev por ejemplo aquellos que miden:  ? El movimiento del tímpano (timpanograma).  ? Leiva audición (audiometría).  ¿Cómo se trata?  El tratamiento depende de la causa y de la gravedad de la afección.  · En los casos leves, es posible aliviar los síntomas con el movimiento de aire hacia los oídos. [place] se denomina “destaparse los oídos”.  · En los casos más graves, o si tiene síntomas de líquido en los oídos, el tratamiento puede incluir:  ? Medicamentos para aliviar la congestión (descongestivos).  ? Medicamentos para tratar alergias (antihistamínicos).  ? Aerosoles nasales o gotas óticas que contengan medicamentos que reduzcan la inflamación (corticoesteroides).  ? Un procedimiento para drenar el líquido del tímpano (miringotomía). En avery procedimiento, se coloca un tubo pequeño en el tímpano para:  § Drenar el líquido.  § Restablecer el aire en el espacio del oído medio.  ? Un procedimiento para insertar un dispositivo de balón a través de la nariz para inflar la abertura de la trompa de Vin (dilatación con balón).  Siga estas indicaciones en leiva casa:  Estilo de kenna  · No dick ninguna de estas cosas hasta que el médico lo autorice:  ? Viajar a grandes [place].  ? Viajar en avión.  ? Trabajar en adele cabina o adele habitación presurizada.  ? Practicar buceo.  · No consuma ningún producto que contenga nicotina o tabaco, sanjeev cigarrillos y cigarrillos electrónicos. Si necesita ayuda para dejar de consumir, consulte al  médico.  · Mantenga los oídos secos. Use tapones para los oídos que le calcen gemma al ducharse y bañarse. Después séquese gemma los oídos.  Indicaciones generales  · [place] los medicamentos de venta concha y los recetados solamente sanjeev se lo haya indicado el médico.  · Use las técnicas que le haya recomendado el médico para ayudar a destaparse los oídos. Estas medidas pueden incluir:  ? Goma de mascar.  ? Bostezos.  ? Tragar vigorosamente con frecuencia.  ? Cerrar la boca, taparse la nariz y soplar suavemente sanjeev si estuviera tratando de soplar aire por la nariz.  · Concurrir a todas las visitas de seguimiento sanjeev se lo haya indicado el médico. [place] es importante.  Comuníquese con un médico si:  · Bianka síntomas no desaparecen después del tratamiento.  · Los síntomas regresan después del tratamiento.  · No logra destaparse los oídos.  · Tiene los siguientes síntomas:  ? Fiebre.  ? Dolor en el oído.  ? Dolor en la wendy o el dariela.  ? Líquido que le supura del oído.  · La audición le cambia de repente.  · Se siente muy mareado.  · Pierde el equilibrio.  Resumen  · Disfunción de la trompa de Vin hace referencia a adele afección en la que se forma adele obstrucción en la trompa de Vin.  · Puede ser consecuencia de infecciones del oído, alergias, inhalación de sustancias irritantes o crecimientos anormales en la nariz o la garganta.  · Los síntomas incluyen dolor de oído, pérdida de la audición o zumbidos en los oídos.  · Los casos leves se tratan con maniobras para destapar los oídos, sanjeev bostezar o destaparse los oídos.  · Los casos graves se tratan con medicamentos. También puede realizarse adele cirugía (poco frecuente).  Esta información no tiene sanjeev fin reemplazar el consejo del médico. Asegúrese de hacerle al médico cualquier pregunta que tenga.  Document Released: 08/16/2017 Document Revised: 05/15/2019 Document Reviewed: 05/15/2019  Elsevier Patient Education © 2020 [employer] Inc[employer]

## 2022-01-20 NOTE — LETTER
January 20, 2022         Patient: Fouzia Mayen   YOB: 1975   Date of Visit: 1/20/2022           To Whom it May Concern:    Fouzia Courtney was seen in my clinic on 1/20/2022. Patient is being tested for COVID-19 and may not return to work until test results are available.  If test results were to be positive, patient should follow CDC recommendations for self isolation.  Thank you for making appropriate accommodations as they recover.    If you have any questions or concerns, please don't hesitate to call.        Sincerely,           January Bob P.A.-C.  Electronically Signed

## 2022-01-20 NOTE — PROGRESS NOTES
Subjective     Fouzia Mayen is a 46 y.o. female who presents with URI (x 3 days, sore throat, ear pain, itchy ears, cough, negative home covid test on 1/19/22)    Language line iPad  services used for this encounter.  Language: Georgian.   name: Pauline, ID #[id number]    HPI:  Fouzia Mayen is a 46 y.o. female who presents today for evaluation of URI symptoms.  For the past 3 days patient has had headache, right ear pain/itchiness and mild sore throat/irritation.  She has not had any fever.  She has tried taking Tylenol which helps only the headache.  She is vaccinated for COVID but has not had a booster dose yet.  No sick contacts that she is aware of.      Review of Systems   Constitutional: Negative for chills, fever and malaise/fatigue.   HENT: Positive for ear pain and sore throat. Negative for congestion and sinus pain.    Eyes: Negative for blurred vision and pain.   Respiratory: Positive for cough. Negative for shortness of breath.    Cardiovascular: Negative for chest pain and palpitations.   Gastrointestinal: Negative for abdominal pain, diarrhea, nausea and vomiting.   Musculoskeletal: Negative for myalgias.   Skin: Negative for rash.   Neurological: Positive for headaches. Negative for dizziness.       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  MEDS:   Current Outpatient Medications:   •  lisinopril (PRINIVIL) 20 MG Tab, Take 20 mg by mouth every day., Disp: , Rfl:   •  VITAMIN D PO, Take  by mouth every day., Disp: , Rfl:   ALLERGIES: No Known Allergies  SURGHX:   Past Surgical History:   Procedure Laterality Date   • MN LAP,DIAGNOSTIC ABDOMEN N/A 8/21/2020    Procedure: Diagnostic PELVISCOPY;  Surgeon: Pauline Navarrete  "AMPARO Qureshi;  Location: SURGERY SAME DAY [facility] ORS;  Service: Labor and Delivery   • FL TOTAL ABDOM HYSTERECTOMY N/A 8/21/2020    Procedure: SUPRACERVICAL ABDOMINAL HYSTERECTOMY, -;  Surgeon: Pauline Qureshi M.D.;  Location: SURGERY SAME DAY [facility] ORS;  Service: Labor and Delivery   • SALPINGECTOMY Bilateral 8/21/2020    Procedure: SALPINGECTOMY;  Surgeon: Pauline Qureshi M.D.;  Location: SURGERY SAME DAY [facility] ORS;  Service: Labor and Delivery   • REPEAT C SECTION W TUBAL LIGATION N/A 6/6/2016    Procedure: REPEAT C SECTION WITH  bilateral TUBAL LIGATION;  Surgeon: Savanna Baig M.D.;  Location: LABOR AND DELIVERY;  Service:    • PRIMARY C SECTION  03/20/1998    [facility]     SOCHX:  reports that she quit smoking about 8 years ago. She has a 7.50 pack-year smoking history. She has never used smokeless tobacco. She reports that she does not drink alcohol and does not use drugs.  FH: Family history was reviewed, no pertinent findings to report      Objective     /70   Pulse 82   Temp 36.5 °C (97.7 °F) (Temporal)   Resp 16   Ht 1.626 m (5' 4\")   Wt 72.6 kg (160 lb)   LMP 09/14/2015   SpO2 97%   Breastfeeding No   BMI 27.46 kg/m²      Physical Exam  Constitutional:       Appearance: She is well-developed.   HENT:      Head: Normocephalic and atraumatic.      Right Ear: Ear canal and external ear normal. A middle ear effusion is present. Tympanic membrane is not erythematous.      Left Ear: Hearing, tympanic membrane, ear canal and external ear normal.      Nose: Mucosal edema, congestion and rhinorrhea present. Rhinorrhea is clear.      Mouth/Throat:      Lips: Pink.      Mouth: Mucous membranes are moist.      Pharynx: Uvula midline. Posterior oropharyngeal erythema present. No uvula swelling.      Tonsils: 1+ on the right. 1+ on the left.   Eyes:      Conjunctiva/sclera: Conjunctivae normal.      Pupils: Pupils are equal, round, and reactive to " light.   Cardiovascular:      Rate and Rhythm: Normal rate and regular rhythm.      Heart sounds: Normal heart sounds. No murmur heard.      Pulmonary:      Effort: Pulmonary effort is normal.      Breath sounds: Normal breath sounds. No wheezing.   Musculoskeletal:      Cervical back: Normal range of motion.   Lymphadenopathy:      Cervical: Cervical adenopathy present.   Skin:     General: Skin is warm and dry.      Capillary Refill: Capillary refill takes less than 2 seconds.   Neurological:      Mental Status: She is alert and oriented to person, place, and time.   Psychiatric:         Behavior: Behavior normal.         Judgment: Judgment normal.           Assessment & Plan     1. Dysfunction of right eustachian tube  - Recommend OTC Flonase, OTC Zyrtec and an oral decongestant such as Sudafed    2. Pharyngitis, unspecified etiology  - COVID/SARS CoV-2 PCR; Future  - Group A Strep by PCR; Future  -Supportive care discussed to include salt water gargles, throat lozenges, and increased fluid intake  No rapid strep is available in the urgent care setting today so we will do a group A strep by PCR.  We will hold off on any antibiotic treatment pending the results.    3. Viral URI with cough  - COVID/SARS CoV-2 PCR; Future  - OTC cold/flu medications  - PO fluids  - Rest  - Tylenol or ibuprofen as needed for fever > 100.4 F  *Patient had a nasal swab to test for COVID-19 virus.  Patient was advised to stay home and self isolate/self quarantine while awaiting the results.  Supportive care was reiterated.  Return/ER precautions discussed.           Differential Diagnosis, natural history, and supportive care discussed. Return to the Urgent Care or follow up with your PCP if symptoms fail to resolve, or for any new or worsening symptoms. Emergency room precautions discussed. Patient and/or family appears understanding of information.

## 2022-01-21 LAB
SARS-COV-2 RNA RESP QL NAA+PROBE: DETECTED
SPECIMEN SOURCE: ABNORMAL

## 2022-01-23 LAB
S PYO SPEC QL CULT: NORMAL
SIGNIFICANT IND 70042: NORMAL
SITE SITE: NORMAL
SOURCE SOURCE: NORMAL

## 2024-01-05 ENCOUNTER — HOSPITAL ENCOUNTER (OUTPATIENT)
Dept: RADIOLOGY | Facility: MEDICAL CENTER | Age: 49
End: 2024-01-05
Attending: FAMILY MEDICINE
Payer: COMMERCIAL

## 2024-01-05 DIAGNOSIS — R22.9 SUBCUTANEOUS NODULE: ICD-10-CM

## 2024-03-21 ENCOUNTER — OFFICE VISIT (OUTPATIENT)
Dept: URGENT CARE | Facility: PHYSICIAN GROUP | Age: 49
End: 2024-03-21
Payer: COMMERCIAL

## 2024-03-21 ENCOUNTER — APPOINTMENT (OUTPATIENT)
Dept: URGENT CARE | Facility: PHYSICIAN GROUP | Age: 49
End: 2024-03-21

## 2024-03-21 VITALS
WEIGHT: 161 LBS | HEIGHT: 64 IN | TEMPERATURE: 98 F | BODY MASS INDEX: 27.49 KG/M2 | OXYGEN SATURATION: 98 % | SYSTOLIC BLOOD PRESSURE: 124 MMHG | RESPIRATION RATE: 20 BRPM | HEART RATE: 75 BPM | DIASTOLIC BLOOD PRESSURE: 80 MMHG

## 2024-03-21 DIAGNOSIS — J06.9 VIRAL URI: ICD-10-CM

## 2024-03-21 DIAGNOSIS — R05.1 ACUTE COUGH: Primary | ICD-10-CM

## 2024-03-21 DIAGNOSIS — J02.9 PHARYNGITIS, UNSPECIFIED ETIOLOGY: ICD-10-CM

## 2024-03-21 LAB
FLUAV RNA SPEC QL NAA+PROBE: NEGATIVE
FLUBV RNA SPEC QL NAA+PROBE: NEGATIVE
RSV RNA SPEC QL NAA+PROBE: NEGATIVE
SARS-COV-2 RNA RESP QL NAA+PROBE: NEGATIVE

## 2024-03-21 PROCEDURE — 3074F SYST BP LT 130 MM HG: CPT

## 2024-03-21 PROCEDURE — 3079F DIAST BP 80-89 MM HG: CPT

## 2024-03-21 PROCEDURE — 99213 OFFICE O/P EST LOW 20 MIN: CPT

## 2024-03-21 PROCEDURE — 0241U POCT CEPHEID COV-2, FLU A/B, RSV - PCR: CPT

## 2024-03-21 RX ORDER — DEXTROMETHORPHAN HYDROBROMIDE AND PROMETHAZINE HYDROCHLORIDE 15; 6.25 MG/5ML; MG/5ML
5 SYRUP ORAL
Qty: 118 ML | Refills: 0 | Status: SHIPPED | OUTPATIENT
Start: 2024-03-21

## 2024-03-21 RX ORDER — DEXAMETHASONE SODIUM PHOSPHATE 10 MG/ML
10 INJECTION INTRAMUSCULAR; INTRAVENOUS ONCE
Status: COMPLETED | OUTPATIENT
Start: 2024-03-21 | End: 2024-03-21

## 2024-03-21 RX ORDER — BENZONATATE 100 MG/1
100 CAPSULE ORAL 3 TIMES DAILY PRN
Qty: 60 CAPSULE | Refills: 0 | Status: SHIPPED | OUTPATIENT
Start: 2024-03-21

## 2024-03-21 RX ADMIN — DEXAMETHASONE SODIUM PHOSPHATE 10 MG: 10 INJECTION INTRAMUSCULAR; INTRAVENOUS at 10:59

## 2024-03-21 ASSESSMENT — ENCOUNTER SYMPTOMS
WHEEZING: 1
MYALGIAS: 1
NAUSEA: 0
PALPITATIONS: 0
EYE PAIN: 0
VOMITING: 0
DIARRHEA: 0
CHILLS: 1
BACK PAIN: 1
EYE DISCHARGE: 0
DIAPHORESIS: 1
ABDOMINAL PAIN: 0
HEMOPTYSIS: 0
SORE THROAT: 1
FEVER: 1
STRIDOR: 0
SPUTUM PRODUCTION: 1
SHORTNESS OF BREATH: 1
DIZZINESS: 0
COUGH: 1
HEADACHES: 1
EYE REDNESS: 0

## 2024-03-21 NOTE — PROGRESS NOTES
Subjective:   Fouzia Mayen is a 48 y.o. female who presents for Cough (4 DAYS ), Congestion (4 DAYS ), and Pneumonia          I introduced myself to the patient and informed them that I am a family nurse practitioner.  Entire appointment was conducted using iPad  Zehra [id number]    HPI:Fouzia comes in today c/o pharyngitis, cough, body aches, headache, fever, chills, nasal congestion. Onset was 4 days. Patient describes symptoms as constant. They describe the pain as body aches, headache. Aggravating factors include none. Relieving factors include none. Treatments tried at home include  none . They describe their symptoms as moderate. Was recently travelling out of state, air travel, started to feel ill 2 days after returning. She does not want antiviral tx if she has Covid. She would like viral testing to find out what she has. Her daughter also started feeling sick today.       Review of Systems   Constitutional:  Positive for chills, diaphoresis, fever and malaise/fatigue.   HENT:  Positive for congestion, ear pain and sore throat.    Eyes:  Negative for pain, discharge and redness.   Respiratory:  Positive for cough, sputum production, shortness of breath and wheezing. Negative for hemoptysis and stridor.    Cardiovascular:  Negative for chest pain and palpitations.   Gastrointestinal:  Negative for abdominal pain, diarrhea, nausea and vomiting.   Genitourinary:  Negative for dysuria.   Musculoskeletal:  Positive for back pain and myalgias.        Positive for back and chest soreness when she coughs   Skin:  Negative for rash.   Neurological:  Positive for headaches. Negative for dizziness.       Medications: lisinopril Tabs  VITAMIN D PO     Allergies: Patient has no known allergies.    Problem List: does not have any pertinent problems on file.    Surgical History:  Past Surgical History:   Procedure Laterality Date    NM LAP,DIAGNOSTIC ABDOMEN N/A 8/21/2020    Procedure:  "Diagnostic PELVISCOPY;  Surgeon: Pauline Qureshi M.D.;  Location: SURGERY SAME DAY [facility] ORS;  Service: Labor and Delivery    RI TOTAL ABDOM HYSTERECTOMY N/A 8/21/2020    Procedure: SUPRACERVICAL ABDOMINAL HYSTERECTOMY, -;  Surgeon: Pauline Qureshi M.D.;  Location: SURGERY SAME DAY [facility] ORS;  Service: Labor and Delivery    SALPINGECTOMY Bilateral 8/21/2020    Procedure: SALPINGECTOMY;  Surgeon: Pauline Qureshi M.D.;  Location: SURGERY SAME DAY [facility] ORS;  Service: Labor and Delivery    REPEAT C SECTION W TUBAL LIGATION N/A 6/6/2016    Procedure: REPEAT C SECTION WITH  bilateral TUBAL LIGATION;  Surgeon: Savanna Baig M.D.;  Location: LABOR AND DELIVERY;  Service:     PRIMARY C SECTION  03/20/1998    [facility]       Past Social Hx:   reports that she quit smoking about 10 years ago. Her smoking use included cigarettes. She started smoking about 25 years ago. She has a 7.5 pack-year smoking history. She has never used smokeless tobacco. She reports that she does not drink alcohol and does not use drugs.     Past Family Hx:   family history is not on file.     Problem list, medications, and allergies reviewed by myself today in Epic.   I have documented what I find to be significant in regards to past medical, social, family and surgical history  in my HPI or under PMH/PSH/FH review section, otherwise it is noncontributory     Objective:     /80   Pulse 75   Temp 36.7 °C (98 °F) (Temporal)   Resp 20   Ht 1.626 m (5' 4\")   Wt 73 kg (161 lb)   LMP 09/14/2015   SpO2 98%   BMI 27.64 kg/m²     During this visit, appropriate PPE was worn, and hand hygiene was performed.    Physical Exam  Vitals reviewed.   Constitutional:       General: She is not in acute distress.     Appearance: Normal appearance. She is not ill-appearing or toxic-appearing.   HENT:      Head: Normocephalic and atraumatic.      Right Ear: Tympanic membrane, ear canal and " external ear normal. There is no impacted cerumen.      Left Ear: Tympanic membrane, ear canal and external ear normal. There is no impacted cerumen.      Nose: Congestion present. No rhinorrhea.      Mouth/Throat:      Pharynx: Oropharynx is clear. Posterior oropharyngeal erythema present. No oropharyngeal exudate.      Comments: No tonsillar swelling, bilaterally.  There is some posterior oropharyngeal erythema present, no exudates or cobblestoning.  No soft tissue swelling of the sublingual mucosa, no petechia or swelling of the soft or hard palate, no unilarteral oropharynx swelling, no sign of tonsillar stone, epiglottitis, or abscess.  Airway is patent and there is no stridor.  Patient is managing oral secretions appropriately.  Uvula is midline and appropriate size with no erythema or edema.    Eyes:      General: No scleral icterus.        Right eye: No discharge.         Left eye: No discharge.      Conjunctiva/sclera: Conjunctivae normal.      Pupils: Pupils are equal, round, and reactive to light.   Cardiovascular:      Rate and Rhythm: Normal rate and regular rhythm.      Heart sounds: Normal heart sounds. No murmur heard.     No friction rub. No gallop.   Pulmonary:      Effort: Pulmonary effort is normal. No respiratory distress.      Breath sounds: Normal breath sounds. No wheezing, rhonchi or rales.   Abdominal:      General: There is no distension.   Musculoskeletal:         General: Normal range of motion.      Cervical back: Normal range of motion. No rigidity.      Right lower leg: No edema.      Left lower leg: No edema.   Lymphadenopathy:      Cervical: No cervical adenopathy.   Skin:     General: Skin is warm and dry.      Coloration: Skin is not jaundiced.   Neurological:      General: No focal deficit present.      Mental Status: She is alert and oriented to person, place, and time. Mental status is at baseline.   Psychiatric:         Mood and Affect: Mood normal.         Behavior: Behavior  normal.         Thought Content: Thought content normal.         Judgment: Judgment normal.       Lab Results/POC Test Results   Results for orders placed or performed in visit on 03/21/24   POCT CoV-2, Flu A/B, RSV by PCR   Result Value Ref Range    SARS-CoV-2 by PCR Negative Negative, Invalid    Influenza virus A RNA Negative Negative, Invalid    Influenza virus B, PCR Negative Negative, Invalid    RSV, PCR Negative Negative, Invalid             Assessment/Plan:     Diagnosis and associated orders:     1. Acute cough  benzonatate (TESSALON) 100 MG Cap    promethazine-dextromethorphan (PROMETHAZINE-DM) 6.25-15 MG/5ML syrup      2. Viral URI  POCT CoV-2, Flu A/B, RSV by PCR      3. Pharyngitis, unspecified etiology  dexamethasone (Decadron) injection (check route below) 10 mg         Comments/MDM:     1. Viral URI    I discussed with patient I will call them only if PCR testing in clinic today is positive and we need to discuss further treatment otherwise treatment will be the supportive care measures we discussed in clinic today.  Results will be available on [employer] if they have this set up.  They state they understand and are agreeable.  We discussed viral versus bacterial infection, and I informed patient that they have a self-limiting viral URI at this time and antibiotics are not an effective treatment for this.    I instructed them that the management for this is supportive care-we discussed cough/deep breathing exercises, drink plenty of fluids, get plenty of rest, try a humidifier in bedroom at night to help them sleep, gargle with salt water/honey/OTC Cepacol lozenges to help ease sore throat.    I instr patient they may use OTC cold and flu meds to treat symptoms  (caution regarding checking ingredients as some meds contain tylenol); may use tylenol alternated with ibuprofen for pain/fever - may take tylenol 1000mg every 6 hours, do not exceed 3000 mg in 24 hours; ibuprofen (if not contraindicated) start  with lowest effective dose, 200mg every 8 hours, may increase to up to 400 mg every 8 hours if needed, take with food.  Patient was instructed that they should feel better in 7-10 days, (but some viral illnesses can last 2 weeks or longer, with residual cough possible for over a month) but to return to clinic if symptoms do not improve or worsen after 10-14 days.   We did discuss red flags and when to return to urgent care versus when to go to the emergency room and strict ER precautions were given.    I did print written instructions for patient, and did go over the diagnosis including differentials, and side effects of each medication with them and answer their questions to the best of my ability.   Advised the patient to follow-up with the primary care physician for recheck, reevaluation, and consideration of further management.  Strict ER precautions discussed for any  chest pain, difficulty breathing, difficulty swallowing, wheezing, stridor, or drooling, fever that does not respond to ibuprofen and Tylenol, inability to tolerate fluids, dehydration, lethargy.    Patient states they have good understanding and they are agreeable with the plan of care.     - POCT CoV-2, Flu A/B, RSV by PCR    2. Pharyngitis, unspecified etiology  I did offer patient a dose of Decadron in clinic today to help relieve inflamed throat tissue, instructed them regarding purpose, side effects, precautions.  They state they understand, and want to go ahead with the dose.  I did keep them in clinic for 10 minutes after the dose, they tolerated well with no adverse reactions before discharge.    - dexamethasone (Decadron) injection (check route below) 10 mg    3. Acute cough  Patient states cough is troublesome and is asking for something to suppress it, especially during the night when cough is keeping them awake.  Instruct them regarding Tessalon pearls, purpose, side effects, precautions, may take it during daytime, will not make you  drowsy;  Instructed patient regarding Promethazine DM for nighttime to help with sleep and cough suppression, purpose, S/E, precautions including the sedating effects of promethazine.  They state they understand, they feel that the benefits at this point will outweigh the risks, would like to go ahead with the prescription as they state they need to get some sleep.     - benzonatate (TESSALON) 100 MG Cap; Take 1 Capsule by mouth 3 times a day as needed for Cough.  Dispense: 60 Capsule; Refill: 0  - promethazine-dextromethorphan (PROMETHAZINE-DM) 6.25-15 MG/5ML syrup; Take 5 mL by mouth at bedtime as needed for Cough.  Dispense: 118 mL; Refill: 0         Pt is clinically stable at today's acute urgent care visit. Vital signs are normal and reassuring.  No acute distress noted. Appropriate for outpatient management at this time.        I personally reviewed prior external notes and test results pertinent to today's visit.  I have independently reviewed and interpreted all diagnostics ordered during this urgent care acute visit.        Please note that this dictation was created using voice recognition software. I have made a reasonable attempt to correct obvious errors, but I expect that there are errors of grammar and possibly content that I did not discover before finalizing the note.    This note was electronically signed by Jhon TRAN, ADAIR, JULIO, JEANETTE

## 2024-05-29 NOTE — LETTER
March 21, 2024    To Whom It May Concern:         This is confirmation that Fouzia Mayen attended her scheduled appointment with BALDEMAR Persaud on 3/21/24.    They are medically excused form work due to illness from 03/21/2024 through 03/22/2024. They may return to work on 03/23/2024.           If you have any questions please do not hesitate to call me at the phone number listed below.    Sincerely,          CHAVO Persaud.  [phone]                  
28.5

## 2024-10-31 ENCOUNTER — HOSPITAL ENCOUNTER (OUTPATIENT)
Dept: RADIOLOGY | Facility: MEDICAL CENTER | Age: 49
End: 2024-10-31
Attending: FAMILY MEDICINE
Payer: COMMERCIAL

## 2024-10-31 DIAGNOSIS — R22.9 SUBCUTANEOUS NODULE: ICD-10-CM

## (undated) DEVICE — ELECTRODE 5MM LHK LAPSCP STERILE DISP- MEGADYNE  (5/CA)

## (undated) DEVICE — TROCAR 5X100 BLADED ADVANCE - FIXATION (6/BX)

## (undated) DEVICE — GOWN WARMING STANDARD FLEX - (30/CA)

## (undated) DEVICE — SET LEADWIRE 5 LEAD BEDSIDE DISPOSABLE ECG (1SET OF 5/EA)

## (undated) DEVICE — PENCIL ELECTSURG 10FT BTN SWH - (50/CA)

## (undated) DEVICE — SLEEVE, VASO, THIGH, MED

## (undated) DEVICE — PAD LAP STERILE 18 X 18 - (5/PK 40PK/CA)

## (undated) DEVICE — SENSOR SPO2 NEO LNCS ADHESIVE (20/BX) SEE USER NOTES

## (undated) DEVICE — SURGIFOAM (SIZE 100) - (6EA/CA)

## (undated) DEVICE — CATHETER URETHRAL FOLEY SILICONE OD16 FR 10 ML (10EA/CA)

## (undated) DEVICE — SET IRRIGATION CYSTOSCOPY TUBE L80 IN (20EA/CA)

## (undated) DEVICE — CHLORAPREP 26 ML APPLICATOR - ORANGE TINT(25/CA)

## (undated) DEVICE — GOWN SURGEONS X-LARGE - DISP. (30/CA)

## (undated) DEVICE — SUTURE 2-0 VICRYL PLUS CT-1 36 (36PK/BX)"

## (undated) DEVICE — GLOVE BIOGEL INDICATOR SZ 6.5 SURGICAL PF LTX - (50PR/BX 4BX/CA)

## (undated) DEVICE — LIGASURE LAPAROSCOPIC 5MM - (6EA/CA)

## (undated) DEVICE — CLOSURE PRINEO SKIN - (2EA/BX)

## (undated) DEVICE — TUBING CLEARLINK DUO-VENT - C-FLO (48EA/CA)

## (undated) DEVICE — PAD BABY LAP 4X18 W/O - RINGS PREWASHED 5/PK 40PK/CS

## (undated) DEVICE — DRESSING NON-ADHERING 8 X 3 - (50/BX)

## (undated) DEVICE — SUTURE 1 VICRYL PLUS CT-1 - 18 INCH (12/BX)

## (undated) DEVICE — SUTURE 2-0 VICRYL PLUS CT-2 - 27 INCH (36/BX)

## (undated) DEVICE — LACTATED RINGERS INJ 1000 ML - (14EA/CA 60CA/PF)

## (undated) DEVICE — SUTURE 4-0 27IN VCRL PLUS ANTI (36PK/BX)

## (undated) DEVICE — SUTURE 0 COATED VICRYL 6-18IN - (12PK/BX)

## (undated) DEVICE — SET EXTENSION WITH 2 PORTS (48EA/CA) ***PART #2C8610 IS A SUBSTITUTE*****

## (undated) DEVICE — TOWELS CLOTH SURGICAL - (4/PK 20PK/CA)

## (undated) DEVICE — ELECTRODE DUAL RETURN W/ CORD - (50/PK)

## (undated) DEVICE — TROCAR 5X100 SEPARTATOR ADV - FIXATION (6/BX)

## (undated) DEVICE — CANISTER SUCTION 3000ML MECHANICAL FILTER AUTO SHUTOFF MEDI-VAC NONSTERILE LF DISP  (40EA/CA)

## (undated) DEVICE — ARMREST CRADLE FOAM - (2PR/PK 12PR/CA)

## (undated) DEVICE — NEPTUNE 4 PORT MANIFOLD - (20/PK)

## (undated) DEVICE — CATHETER IV 20 GA X 1-1/4 ---SURG.& SDS ONLY--- (50EA/BX)

## (undated) DEVICE — SET SUCTION/IRRIGATION WITH DISPOSABLE TIP (6/CA )PART #0250-070-520 IS A SUB

## (undated) DEVICE — MASK ANESTHESIA ADULT  - (100/CA)

## (undated) DEVICE — BLADE SURGICAL CLIPPER - (50EA/CA)

## (undated) DEVICE — KIT ANESTHESIA W/CIRCUIT & 3/LT BAG W/FILTER (20EA/CA)

## (undated) DEVICE — GLOVE LITE HANDLE DISP. - (1/PK 80PK/CS)

## (undated) DEVICE — TRAY SRGPRP PVP IOD WT PRP - (20/CA)

## (undated) DEVICE — UTERINE MANIP V-CARE STANDARD DAVINCI (8EA/CA)

## (undated) DEVICE — SUCTION INSTRUMENT YANKAUER BULBOUS TIP W/O VENT (50EA/CA)

## (undated) DEVICE — HEMOSTAT ARISTA PWD 3 GRAM - (5/CA)

## (undated) DEVICE — TUBE CONNECTING SUCTION - CLEAR PLASTIC STERILE 72 IN (50EA/CA)

## (undated) DEVICE — ENDOSTITCH10MM SUTURING DEVIC - (3/CA)

## (undated) DEVICE — GLOVE BIOGEL SZ 6 PF LATEX - (50EA/BX 4BX/CA)

## (undated) DEVICE — CANISTER SUCTION RIGID RED 1500CC (40EA/CA)

## (undated) DEVICE — SUTURE 0 VICRYL PLUS CT-1 - 36 INCH (36/BX)

## (undated) DEVICE — PAD SANITARY 11IN MAXI IND WRAPPED  (12EA/PK 24PK/CA)

## (undated) DEVICE — SUTURE 4-0 VICRYL PLUS FS-2 - 27 INCH (36/BX)

## (undated) DEVICE — GLOVE BIOGEL SZ 6.5 SURGICAL PF LTX (50PR/BX 4BX/CA)

## (undated) DEVICE — TROCAR Z THREAD11MM OPTICAL - NON BLADED(6/BX)

## (undated) DEVICE — PROTECTOR ULNA NERVE - (36PR/CA)

## (undated) DEVICE — HEAD HOLDER JUNIOR/ADULT

## (undated) DEVICE — SPONGE RADIOPAQUE CTN X-LG - STERILE (50PK/CA) MADE TO ORDER ITEM AND HAS A 4-6 WEEK LEAD TIME

## (undated) DEVICE — GLOVE BIOGEL SZ 8.5 SURGICAL PF LTX - (50PR/BX 4BX/CA)

## (undated) DEVICE — UTERINE MANIP V-CARE LARGE - (DAVINCI)  8/CA

## (undated) DEVICE — GLOVE BIOGEL INDICATOR SZ 6 SURGICAL PF LTX -(50/BX)

## (undated) DEVICE — SPONGE GAUZE STER 4X4 8-PL - (2/PK 50PK/BX 12BX/CS)

## (undated) DEVICE — SYRINGE ASEPTO - (50EA/CA

## (undated) DEVICE — SUTURE GENERAL

## (undated) DEVICE — SODIUM CHL IRRIGATION 0.9% 1000ML (12EA/CA)

## (undated) DEVICE — WATER IRRIGATION STERILE 1000ML (12EA/CA)

## (undated) DEVICE — DRAPESURG STERI-DRAPE LONG - (10/BX 4BX/CA)

## (undated) DEVICE — ELECTRODE 850 FOAM ADHESIVE - HYDROGEL RADIOTRNSPRNT (50/PK)

## (undated) DEVICE — SPONGE PEANUT - (5/PK 50PK/CA)

## (undated) DEVICE — TRAY FOLEY CATHETER STATLOCK 16FR SURESTEP  (10EA/CA)

## (undated) DEVICE — KIT  I.V. START (100EA/CA)

## (undated) DEVICE — DERMABOND ADVANCED - (12EA/BX)

## (undated) DEVICE — APPICATOR HEMOSTAT ARISTA XL - FLEXITIP (10EA/CA)

## (undated) DEVICE — PACK LAPAROSCOPY - (1/CA)